# Patient Record
Sex: FEMALE | Race: WHITE | Employment: FULL TIME | URBAN - METROPOLITAN AREA
[De-identification: names, ages, dates, MRNs, and addresses within clinical notes are randomized per-mention and may not be internally consistent; named-entity substitution may affect disease eponyms.]

---

## 2021-10-27 ENCOUNTER — APPOINTMENT (OUTPATIENT)
Dept: CT IMAGING | Age: 61
DRG: 064 | End: 2021-10-27
Payer: COMMERCIAL

## 2021-10-27 ENCOUNTER — HOSPITAL ENCOUNTER (INPATIENT)
Age: 61
LOS: 2 days | Discharge: HOME | DRG: 064 | End: 2021-10-29
Attending: EMERGENCY MEDICINE | Admitting: INTERNAL MEDICINE
Payer: COMMERCIAL

## 2021-10-27 DIAGNOSIS — R41.82 ALTERED MENTAL STATUS, UNSPECIFIED ALTERED MENTAL STATUS TYPE: Primary | ICD-10-CM

## 2021-10-27 DIAGNOSIS — I63.81 BASAL GANGLIA STROKE (HCC): ICD-10-CM

## 2021-10-27 DIAGNOSIS — R13.10 DYSPHAGIA, UNSPECIFIED TYPE: ICD-10-CM

## 2021-10-27 LAB
ABSOLUTE EOS #: 0 K/UL (ref 0–0.4)
ABSOLUTE IMMATURE GRANULOCYTE: ABNORMAL K/UL (ref 0–0.3)
ABSOLUTE LYMPH #: 1.1 K/UL (ref 1–4.8)
ABSOLUTE MONO #: 0.3 K/UL (ref 0.1–1.3)
ALBUMIN SERPL-MCNC: 4.5 G/DL (ref 3.5–5.2)
ALBUMIN/GLOBULIN RATIO: NORMAL (ref 1–2.5)
ALP BLD-CCNC: 64 U/L (ref 35–104)
ALT SERPL-CCNC: 18 U/L (ref 5–33)
ANION GAP SERPL CALCULATED.3IONS-SCNC: 9 MMOL/L (ref 9–17)
AST SERPL-CCNC: 19 U/L
BASOPHILS # BLD: 1 % (ref 0–2)
BASOPHILS ABSOLUTE: 0 K/UL (ref 0–0.2)
BILIRUB SERPL-MCNC: 0.53 MG/DL (ref 0.3–1.2)
BILIRUBIN DIRECT: 0.13 MG/DL
BILIRUBIN, INDIRECT: 0.4 MG/DL (ref 0–1)
BNP INTERPRETATION: ABNORMAL
BUN BLDV-MCNC: 20 MG/DL (ref 8–23)
BUN/CREAT BLD: NORMAL (ref 9–20)
CALCIUM SERPL-MCNC: 9.4 MG/DL (ref 8.6–10.4)
CHLORIDE BLD-SCNC: 103 MMOL/L (ref 98–107)
CO2: 29 MMOL/L (ref 20–31)
CREAT SERPL-MCNC: 0.86 MG/DL (ref 0.5–0.9)
DIFFERENTIAL TYPE: ABNORMAL
EOSINOPHILS RELATIVE PERCENT: 1 % (ref 0–4)
GFR AFRICAN AMERICAN: >60 ML/MIN
GFR NON-AFRICAN AMERICAN: >60 ML/MIN
GFR SERPL CREATININE-BSD FRML MDRD: NORMAL ML/MIN/{1.73_M2}
GFR SERPL CREATININE-BSD FRML MDRD: NORMAL ML/MIN/{1.73_M2}
GLOBULIN: NORMAL G/DL (ref 1.5–3.8)
GLUCOSE BLD-MCNC: 99 MG/DL (ref 70–99)
HCT VFR BLD CALC: 42.2 % (ref 36–46)
HEMOGLOBIN: 13.8 G/DL (ref 12–16)
IMMATURE GRANULOCYTES: ABNORMAL %
LIPASE: 15 U/L (ref 13–60)
LYMPHOCYTES # BLD: 20 % (ref 24–44)
MAGNESIUM: 2 MG/DL (ref 1.6–2.6)
MCH RBC QN AUTO: 30.8 PG (ref 26–34)
MCHC RBC AUTO-ENTMCNC: 32.7 G/DL (ref 31–37)
MCV RBC AUTO: 94.2 FL (ref 80–100)
MONOCYTES # BLD: 5 % (ref 1–7)
NRBC AUTOMATED: ABNORMAL PER 100 WBC
PDW BLD-RTO: 13.7 % (ref 11.5–14.9)
PLATELET # BLD: 200 K/UL (ref 150–450)
PLATELET ESTIMATE: ABNORMAL
PMV BLD AUTO: 9.2 FL (ref 6–12)
POTASSIUM SERPL-SCNC: 3.9 MMOL/L (ref 3.7–5.3)
PRO-BNP: 562 PG/ML
RBC # BLD: 4.48 M/UL (ref 4–5.2)
RBC # BLD: ABNORMAL 10*6/UL
SEG NEUTROPHILS: 73 % (ref 36–66)
SEGMENTED NEUTROPHILS ABSOLUTE COUNT: 4 K/UL (ref 1.3–9.1)
SODIUM BLD-SCNC: 141 MMOL/L (ref 135–144)
TOTAL PROTEIN: 6.5 G/DL (ref 6.4–8.3)
TROPONIN INTERP: NORMAL
TROPONIN T: NORMAL NG/ML
TROPONIN, HIGH SENSITIVITY: 7 NG/L (ref 0–14)
WBC # BLD: 5.5 K/UL (ref 3.5–11)
WBC # BLD: ABNORMAL 10*3/UL

## 2021-10-27 PROCEDURE — 85025 COMPLETE CBC W/AUTO DIFF WBC: CPT

## 2021-10-27 PROCEDURE — 70496 CT ANGIOGRAPHY HEAD: CPT

## 2021-10-27 PROCEDURE — 93005 ELECTROCARDIOGRAM TRACING: CPT | Performed by: HEALTH CARE PROVIDER

## 2021-10-27 PROCEDURE — 36415 COLL VENOUS BLD VENIPUNCTURE: CPT

## 2021-10-27 PROCEDURE — 83690 ASSAY OF LIPASE: CPT

## 2021-10-27 PROCEDURE — 2060000000 HC ICU INTERMEDIATE R&B

## 2021-10-27 PROCEDURE — 99223 1ST HOSP IP/OBS HIGH 75: CPT | Performed by: INTERNAL MEDICINE

## 2021-10-27 PROCEDURE — 80076 HEPATIC FUNCTION PANEL: CPT

## 2021-10-27 PROCEDURE — 6370000000 HC RX 637 (ALT 250 FOR IP): Performed by: HEALTH CARE PROVIDER

## 2021-10-27 PROCEDURE — 83735 ASSAY OF MAGNESIUM: CPT

## 2021-10-27 PROCEDURE — 6360000004 HC RX CONTRAST MEDICATION: Performed by: HEALTH CARE PROVIDER

## 2021-10-27 PROCEDURE — 70450 CT HEAD/BRAIN W/O DYE: CPT

## 2021-10-27 PROCEDURE — 99285 EMERGENCY DEPT VISIT HI MDM: CPT

## 2021-10-27 PROCEDURE — 80048 BASIC METABOLIC PNL TOTAL CA: CPT

## 2021-10-27 PROCEDURE — 84484 ASSAY OF TROPONIN QUANT: CPT

## 2021-10-27 PROCEDURE — 83880 ASSAY OF NATRIURETIC PEPTIDE: CPT

## 2021-10-27 PROCEDURE — 2580000003 HC RX 258: Performed by: HEALTH CARE PROVIDER

## 2021-10-27 RX ORDER — ASPIRIN 81 MG/1
81 TABLET ORAL DAILY
Status: DISCONTINUED | OUTPATIENT
Start: 2021-10-28 | End: 2021-10-28

## 2021-10-27 RX ORDER — ASPIRIN 81 MG/1
324 TABLET, CHEWABLE ORAL ONCE
Status: COMPLETED | OUTPATIENT
Start: 2021-10-27 | End: 2021-10-27

## 2021-10-27 RX ORDER — 0.9 % SODIUM CHLORIDE 0.9 %
1000 INTRAVENOUS SOLUTION INTRAVENOUS ONCE
Status: DISCONTINUED | OUTPATIENT
Start: 2021-10-27 | End: 2021-10-29 | Stop reason: HOSPADM

## 2021-10-27 RX ORDER — SODIUM CHLORIDE 9 MG/ML
INJECTION, SOLUTION INTRAVENOUS CONTINUOUS
Status: DISCONTINUED | OUTPATIENT
Start: 2021-10-28 | End: 2021-10-28

## 2021-10-27 RX ORDER — SODIUM CHLORIDE 0.9 % (FLUSH) 0.9 %
10 SYRINGE (ML) INJECTION PRN
Status: DISCONTINUED | OUTPATIENT
Start: 2021-10-27 | End: 2021-10-29 | Stop reason: HOSPADM

## 2021-10-27 RX ORDER — 0.9 % SODIUM CHLORIDE 0.9 %
80 INTRAVENOUS SOLUTION INTRAVENOUS ONCE
Status: COMPLETED | OUTPATIENT
Start: 2021-10-27 | End: 2021-10-27

## 2021-10-27 RX ORDER — ASPIRIN 300 MG/1
300 SUPPOSITORY RECTAL ONCE
Status: DISCONTINUED | OUTPATIENT
Start: 2021-10-27 | End: 2021-10-27

## 2021-10-27 RX ORDER — ATORVASTATIN CALCIUM 40 MG/1
40 TABLET, FILM COATED ORAL NIGHTLY
Status: DISCONTINUED | OUTPATIENT
Start: 2021-10-28 | End: 2021-10-29

## 2021-10-27 RX ORDER — ONDANSETRON 2 MG/ML
4 INJECTION INTRAMUSCULAR; INTRAVENOUS EVERY 6 HOURS PRN
Status: DISCONTINUED | OUTPATIENT
Start: 2021-10-27 | End: 2021-10-29 | Stop reason: HOSPADM

## 2021-10-27 RX ORDER — SODIUM CHLORIDE 9 MG/ML
25 INJECTION, SOLUTION INTRAVENOUS PRN
Status: DISCONTINUED | OUTPATIENT
Start: 2021-10-27 | End: 2021-10-29 | Stop reason: HOSPADM

## 2021-10-27 RX ORDER — SODIUM CHLORIDE 0.9 % (FLUSH) 0.9 %
10 SYRINGE (ML) INJECTION EVERY 12 HOURS SCHEDULED
Status: DISCONTINUED | OUTPATIENT
Start: 2021-10-28 | End: 2021-10-29 | Stop reason: HOSPADM

## 2021-10-27 RX ADMIN — IOPAMIDOL 75 ML: 755 INJECTION, SOLUTION INTRAVENOUS at 20:06

## 2021-10-27 RX ADMIN — SODIUM CHLORIDE, PRESERVATIVE FREE 10 ML: 5 INJECTION INTRAVENOUS at 20:06

## 2021-10-27 RX ADMIN — ASPIRIN 324 MG: 81 TABLET, CHEWABLE ORAL at 21:47

## 2021-10-27 RX ADMIN — SODIUM CHLORIDE 80 ML: 9 INJECTION, SOLUTION INTRAVENOUS at 20:06

## 2021-10-27 ASSESSMENT — ENCOUNTER SYMPTOMS
VOMITING: 0
SHORTNESS OF BREATH: 0
ABDOMINAL PAIN: 0
BACK PAIN: 0
PHOTOPHOBIA: 0
TROUBLE SWALLOWING: 0
NAUSEA: 0

## 2021-10-27 ASSESSMENT — PAIN SCALES - GENERAL: PAINLEVEL_OUTOF10: 0

## 2021-10-27 NOTE — ED PROVIDER NOTES
[address] [facility]  eMERGENCY dEPARTMENT eNCOUnter   Attending Attestation     Pt Name: Simone Worley  MRN: [id number]  [place] 1960  Date of evaluation: 10/27/21    History, EXAM, MDM:    Simone Worley is a 59 y.o. female who presents with Altered Mental Status    Fatigue, word finding difficulties, withdrawn. Symptoms present for the last 4 days. On exam, VSS, GCS 15, NIHSS 1 for a mild dysarthria. Laboratory studies show no acute abnormalities. EKG shows a sinus rhythm. HR is  80,  , QRS 78, , no OLLIE, No STD, No TWI, the axis is normal.      CT head shows signs of a subacute stroke in the basal ganglia. Pt treated with aspirin. Admitting to hospital for further stroke evaluation. Pt not a TPA candidate given delayed presentation. Pt not an endovascular candidate given lack of large vessel occlusion / delayed presentation. Vitals:   Vitals:    10/27/21 2220 10/27/21 2330 10/28/21 0048 10/28/21 0736   BP: (!) 161/89 (!) 155/77  (!) 150/75   Pulse: 80 72  52   Resp: 17 18  14   Temp:  98.7 °F (37.1 °C)  97.9 °F (36.6 °C)   TempSrc:  Oral  Oral   SpO2:  96%  98%   Weight:   130 lb (59 kg)    Height:   5' 5\" (1.651 m)      I performed a history and physical examination of the patient and discussed management with the resident. I reviewed the residents note and agree with the documented findings and plan of care. Any areas of disagreement are noted on the chart. I was personally present for the key portions of any procedures. I have documented in the chart those procedures where I was not present during the key portions. I have personally reviewed all images and agree with the resident's interpretation. I have reviewed the emergency nurses triage note. I agree with the chief complaint, past medical history, past surgical history, allergies, medications, social and family history as documented unless otherwise noted below.  Documentation of the HPI, Physical Exam and Medical Decision Making performed by medical students or scribes is based on my personal performance of the HPI, PE and MDM. For Phys Assistant/ Nurse Practitioner cases/documentation I have had a face to face evaluation of this patient and have completed at least one if not all key elements of the E/M (history, physical exam, and MDM). Additional findings are as noted.     Mark Sheth MD  Attending Emergency  Physician                Mark Sheth MD  10/28/21 [id number]       Mark Sheth MD  10/28/21 7969

## 2021-10-27 NOTE — PROGRESS NOTES
Patient has CT head and CTA head/neck with contrast ordered at 4:31pm. As of 6:20pm, patient does not have an IV established or labs drawn yet. ED has been notified.

## 2021-10-27 NOTE — ED TRIAGE NOTES
Mode of arrival (squad #, walk in, police, etc) : walk in        Chief complaint(s): Altered Mental Status        Arrival Note (brief scenario, treatment PTA, etc). : Pt brought in by her  for eval of change in mentation. He states she has not been acting like herself since Saturday night and has worsened over the last few days. Pt denies urinary complaints and headache/dizziness. C= \"Have you ever felt that you should Cut down on your drinking? \"  No  A= \"Have people Annoyed you by criticizing your drinking? \"  No  G= \"Have you ever felt bad or Guilty about your drinking? \"  No  E= \"Have you ever had a drink as an Eye-opener first thing in the morning to steady your nerves or to help a hangover? \"  No      Deferred []      Reason for deferring: N/A    *If yes to two or more: probable alcohol abuse. *

## 2021-10-27 NOTE — Clinical Note
Patient Class: Observation [104]   REQUIRED: Diagnosis: Altered mental status [780.97. ICD-9-CM]   Estimated Length of Stay: Estimated stay of less than 2 midnights   Admitting Provider: Fabrizio Mac [[id number]]   Telemetry/Cardiac Monitoring Required?: No

## 2021-10-27 NOTE — ED PROVIDER NOTES
[address] [facility] ED  Emergency Department Encounter  Emergency Medicine Resident     Pt Name: Glen Drake  MRN: [id number]  [place] 1960  Date of evaluation: 10/27/21  PCP:  No primary care provider on file. CHIEF COMPLAINT       Chief Complaint   Patient presents with    Altered Mental Status       HISTORY OFPRESENT ILLNESS  (Location/Symptom, Timing/Onset, Context/Setting, Quality, Duration, Modifying Factors,Severity.)      Glen Drake is a 59 y.o. female who presents with altered mental status. Symptoms started Sunday. Patient's  is with her today. Patient reports issues with finding words and forming sentences, as well as increased fatigue. His  also notes that she appears withdrawn and is not her normal, talkative self. Patient denies any mood changes. She has no past medical history or surgical history. Patient does not take any prescription medications, but states she did start an over-the-counter herbal supplements last Wednesday, which is meant to improve her libido. Denies tobacco or drug use. Drinks 1 glass of red wine per day, but no more than that. Denies any family history of early onset Alzheimer's, or other neurodegenerative diseases. PAST MEDICAL / SURGICAL / SOCIAL / FAMILY HISTORY     Denies past medical history     has a past surgical history that includes Tonsillectomy.      Social History     Socioeconomic History    Marital status:      Spouse name: Not on file    Number of children: Not on file    Years of education: Not on file    Highest education level: Not on file   Occupational History    Not on file   Tobacco Use    Smoking status: Never Smoker    Smokeless tobacco: Never Used   Substance and Sexual Activity    Alcohol use: Not on file    Drug use: Never    Sexual activity: Not on file   Other Topics Concern    Not on file   Social History Narrative    Not on file     Social Determinants of Health     Financial Resource Strain:     Difficulty of Paying Living Expenses:    Food Insecurity:     Worried About Running Out of Food in the Last Year:     [address] in the Last Year:    Transportation Needs:     Lack of Transportation (Medical):  Lack of Transportation (Non-Medical):    Physical Activity:     Days of Exercise per Week:     Minutes of Exercise per Session:    Stress:     Feeling of Stress :    Social Connections:     Frequency of Communication with Friends and Family:     Frequency of Social Gatherings with Friends and Family:     Attends [employer] Services:     Active Member of Clubs or Organizations:     Attends Club or Organization Meetings:     Marital Status:    Intimate Partner Violence:     Fear of Current or Ex-Partner:     Emotionally Abused:     Physically Abused:     Sexually Abused:        History reviewed. No pertinent family history. Allergies:  Pcn [penicillins] and Suprax [cefixime]    Home Medications:  Prior to Admission medications    Not on File       REVIEW OFSYSTEMS    (2-9 systems for level 4, 10 or more for level 5)      Review of Systems   Constitutional: Positive for fatigue. Negative for chills, diaphoresis and fever. HENT: Positive for tinnitus. Negative for trouble swallowing. Left-sided tinnitus at baseline. No change since onset of symptoms. Eyes: Negative for photophobia and visual disturbance. Respiratory: Negative for shortness of breath. Cardiovascular: Negative for chest pain. Gastrointestinal: Negative for abdominal pain, nausea and vomiting. Genitourinary: Negative for difficulty urinating. Musculoskeletal: Negative for back pain. Allergic/Immunologic: Negative for immunocompromised state. Neurological: Positive for speech difficulty. Negative for dizziness, tremors, seizures, syncope, facial asymmetry, weakness, light-headedness, numbness and headaches. Hematological: Does not bruise/bleed easily. Psychiatric/Behavioral: Negative for decreased concentration, dysphoric mood and sleep disturbance. PHYSICAL EXAM   (up to 7 for level 4, 8 or more forlevel 5)      INITIAL VITALS:   ED Triage Vitals [10/27/21 1505]   BP Temp Temp Source Pulse Resp SpO2 Height Weight   (!) 154/60 98.8 °F (37.1 °C) Oral 77 16 97 % -- --       Physical Exam  Vitals reviewed. Constitutional:       Appearance: She is well-developed. HENT:      Head: Normocephalic and atraumatic. Mouth/Throat:      Mouth: Mucous membranes are moist.      Pharynx: Oropharynx is clear. Eyes:      General: No visual field deficit. Extraocular Movements: Extraocular movements intact. Right eye: Normal extraocular motion and no nystagmus. Left eye: Normal extraocular motion and no nystagmus. Pupils: Pupils are equal, round, and reactive to light. Right eye: Pupil is round and reactive. Left eye: Pupil is round and reactive. Cardiovascular:      Rate and Rhythm: Normal rate and regular rhythm. Heart sounds: Normal heart sounds. Pulmonary:      Effort: Pulmonary effort is normal.      Breath sounds: Normal breath sounds. Abdominal:      General: Bowel sounds are normal.      Palpations: Abdomen is soft. Musculoskeletal:         General: No tenderness. Normal range of motion. Cervical back: Normal range of motion and neck supple. No rigidity. Skin:     General: Skin is warm and dry. Neurological:      Mental Status: She is alert. GCS: GCS eye subscore is 4. GCS verbal subscore is 5. GCS motor subscore is 6. Cranial Nerves: Dysarthria present. No cranial nerve deficit or facial asymmetry. Sensory: No sensory deficit. Motor: No weakness. Coordination: Romberg sign negative. Coordination normal.      Gait: Gait normal.      Comments: Some slight delay in finding words, but responding appropriately. Able to repeat words, name objects and spell world backwards.   Difficulty with subtracting by sevens after she reached 86. Unable to recall 3 words at 3 minutes. Psychiatric:         Mood and Affect: Mood normal.         Behavior: Behavior normal.         DIFFERENTIAL  DIAGNOSIS     PLAN (LABS / IMAGING / EKG):  Orders Placed This Encounter   Procedures    CT Head WO Contrast    CTA HEAD NECK W CONTRAST    Urinalysis Reflex to Culture    Hepatic Function Panel    Magnesium    Troponin    Brain Natriuretic Peptide    Lipase    Basic Metabolic Panel w/ Reflex to MG    CBC Auto Differential    Inpatient consult to Internal Medicine    Inpatient consult to Neurology    EKG 12 Lead    Saline lock IV    Insert peripheral IV    PATIENT STATUS (FROM ED OR OR/PROCEDURAL) Observation       MEDICATIONS ORDERED:  Orders Placed This Encounter   Medications    0.9 % sodium chloride bolus    iopamidol (ISOVUE-370) 76 % injection 75 mL    sodium chloride flush 0.9 % injection 10 mL    0.9 % sodium chloride bolus    aspirin chewable tablet 324 mg    aspirin suppository 300 mg       DDX: Stroke, metabolic derangement, infection, reaction to herbal medication    Initial MDM/Plan: 59 y.o. female who presents with fatigue and increased difficulty word finding. No other focal neurologic deficits besides slowed speech and inability to recall words. Will obtain noncontrast CT of the head, as well as CTA of the head and neck. Laboratory work-up for altered mental status.     DIAGNOSTIC RESULTS / EMERGENCYDEPARTMENT COURSE / MDM     LABS:  Labs Reviewed   BRAIN NATRIURETIC PEPTIDE - Abnormal; Notable for the following components:       Result Value    Pro- (*)     All other components within normal limits   CBC WITH AUTO DIFFERENTIAL - Abnormal; Notable for the following components:    Seg Neutrophils 73 (*)     Lymphocytes 20 (*)     All other components within normal limits   HEPATIC FUNCTION PANEL   MAGNESIUM   TROPONIN   LIPASE   BASIC METABOLIC PANEL W/ REFLEX TO MG FOR LOW K ganglia region lacunar infarcts and a larger infarct within the anterior left basal ganglia region which may be subacute or chronic. CTA HEAD NECK W CONTRAST    Result Date: 10/27/2021  EXAMINATION: CTA OF THE HEAD AND NECK WITH CONTRAST 10/27/2021 7:29 pm: TECHNIQUE: CTA of the head and neck was performed with the administration of intravenous contrast. Multiplanar reformatted images are provided for review. MIP images are provided for review. Stenosis of the internal carotid arteries measured using NASCET criteria. Dose modulation, iterative reconstruction, and/or weight based adjustment of the mA/kV was utilized to reduce the radiation dose to as low as reasonably achievable. COMPARISON: CT head 10/27/2021 HISTORY: ORDERING SYSTEM PROVIDED HISTORY: AMS, difficulty with word-finding TECHNOLOGIST PROVIDED HISTORY: AMS, difficulty with word-finding Decision Support Exception - unselect if not a suspected or confirmed emergency medical condition->Emergency Medical Condition (MA) Reason for Exam: AMS, difficulty with word-finding Acuity: Unknown Type of Exam: Unknown FINDINGS: CTA NECK: AORTIC ARCH/ARCH VESSELS: No dissection or arterial injury. No significant stenosis of the brachiocephalic or subclavian arteries. CAROTID ARTERIES: No dissection, arterial injury, or hemodynamically significant stenosis by NASCET criteria. VERTEBRAL ARTERIES: No dissection, arterial injury, or significant stenosis. SOFT TISSUES: The lung apices are clear. No cervical or superior mediastinal lymphadenopathy. The larynx and pharynx are unremarkable. 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 No acute abnormality of the salivary and thyroid glands. BONES: Degenerate change CTA HEAD: ANTERIOR CIRCULATION: No significant stenosis of the intracranial internal carotid, anterior cerebral, or middle cerebral arteries.   Small infundibulum at the origin the right PCOM. Fetal origin left PCA. Luis Acosta Prominent A-comm likely related to aplastic or hypoplastic left A1 segment. POSTERIOR CIRCULATION: No significant stenosis of the vertebral, basilar, or posterior cerebral arteries. No aneurysm. OTHER: No dural venous sinus thrombosis on this non-dedicated study. BRAIN: Evolving left left anterior basal ganglial infarct. Negative for large vessel occlusion or hemodynamic stenosis. Post mediastinal mass right upper lung, thought to be could represent a neurofibroma versus schwannoma. .  This could be further evaluated with MRI thoracic spine or with and without without contrast for further characterization. EKG    EKG Interpretation    Interpreted by me    Rhythm: normal sinus   Rate: normal  Axis: normal  Ectopy: none  Conduction: normal  ST Segments: no acute change  T Waves: no acute change  Q Waves: none    Clinical Impression: no acute changes and normal EKG    All EKG's are interpreted by the Emergency Department Physicianwho either signs or Co-signs this chart in the absence of a cardiologist.    EMERGENCY DEPARTMENT COURSE:  ED Course as of Oct 27 2210   Wed Oct 27, 2021   2137 Work-up is concerning for subacute stroke. Will give patient 324 of aspirin and admit for neurology evaluation and MRI tomorrow morning. [GG]      ED Course User Index  [GG] Nicho Lazar MD          PROCEDURES:  None    CONSULTS:  IP CONSULT TO INTERNAL MEDICINE  IP CONSULT TO NEUROLOGY    CRITICAL CARE:  Please see attending note    FINAL IMPRESSION      1. Altered mental status, unspecified altered mental status type    2. Basal ganglia stroke ([facility] [address]    3. Dysphagia, unspecified type          DISPOSITION / PLAN     DISPOSITION      Admitted    PATIENT REFERRED TO:  No follow-up provider specified.     DISCHARGE MEDICATIONS:  New Prescriptions    No medications on file       Nicho Lazar MD  Emergency Medicine Resident    (Please note that portions of this note were completed with a voice recognition program.Efforts were made to edit the dictations but occasionally words are mis-transcribed.)        Shady Kim MD  Resident  10/27/21 2431

## 2021-10-28 ENCOUNTER — APPOINTMENT (OUTPATIENT)
Dept: MRI IMAGING | Age: 61
DRG: 064 | End: 2021-10-28
Payer: COMMERCIAL

## 2021-10-28 ENCOUNTER — APPOINTMENT (OUTPATIENT)
Dept: GENERAL RADIOLOGY | Age: 61
DRG: 064 | End: 2021-10-28
Payer: COMMERCIAL

## 2021-10-28 PROBLEM — R91.8 MASS OF UPPER LOBE OF RIGHT LUNG: Status: ACTIVE | Noted: 2021-10-28

## 2021-10-28 PROBLEM — R53.83 FATIGUE: Status: ACTIVE | Noted: 2021-10-28

## 2021-10-28 PROBLEM — R47.89 WORD FINDING DIFFICULTY: Status: ACTIVE | Noted: 2021-10-28

## 2021-10-28 LAB
-: ABNORMAL
-: ABNORMAL
ALBUMIN SERPL-MCNC: 4 G/DL (ref 3.5–5.2)
ALBUMIN/GLOBULIN RATIO: ABNORMAL (ref 1–2.5)
ALP BLD-CCNC: 51 U/L (ref 35–104)
ALT SERPL-CCNC: 15 U/L (ref 5–33)
AMORPHOUS: ABNORMAL
AMORPHOUS: ABNORMAL
AMPHETAMINE SCREEN URINE: NEGATIVE
ANION GAP SERPL CALCULATED.3IONS-SCNC: 8 MMOL/L (ref 9–17)
AST SERPL-CCNC: 14 U/L
BACTERIA: ABNORMAL
BACTERIA: ABNORMAL
BARBITURATE SCREEN URINE: NEGATIVE
BENZODIAZEPINE SCREEN, URINE: NEGATIVE
BILIRUB SERPL-MCNC: 0.71 MG/DL (ref 0.3–1.2)
BILIRUBIN URINE: NEGATIVE
BILIRUBIN URINE: NEGATIVE
BUN BLDV-MCNC: 14 MG/DL (ref 8–23)
BUN/CREAT BLD: ABNORMAL (ref 9–20)
BUPRENORPHINE URINE: NORMAL
CALCIUM SERPL-MCNC: 8.9 MG/DL (ref 8.6–10.4)
CANNABINOID SCREEN URINE: NEGATIVE
CASTS UA: ABNORMAL /LPF
CASTS UA: ABNORMAL /LPF
CHLORIDE BLD-SCNC: 106 MMOL/L (ref 98–107)
CHOLESTEROL/HDL RATIO: 2.8
CHOLESTEROL: 167 MG/DL
CO2: 29 MMOL/L (ref 20–31)
COCAINE METABOLITE, URINE: NEGATIVE
COLOR: YELLOW
COLOR: YELLOW
COMMENT UA: ABNORMAL
COMMENT UA: ABNORMAL
CREAT SERPL-MCNC: 0.63 MG/DL (ref 0.5–0.9)
CRYSTALS, UA: ABNORMAL /HPF
CRYSTALS, UA: ABNORMAL /HPF
D-DIMER QUANTITATIVE: 0.3 MG/L FEU (ref 0–0.59)
EKG ATRIAL RATE: 80 BPM
EKG P AXIS: 74 DEGREES
EKG P-R INTERVAL: 160 MS
EKG Q-T INTERVAL: 378 MS
EKG QRS DURATION: 78 MS
EKG QTC CALCULATION (BAZETT): 435 MS
EKG R AXIS: 64 DEGREES
EKG T AXIS: 73 DEGREES
EKG VENTRICULAR RATE: 80 BPM
EPITHELIAL CELLS UA: ABNORMAL /HPF
EPITHELIAL CELLS UA: ABNORMAL /HPF
ESTIMATED AVERAGE GLUCOSE: 97 MG/DL
GFR AFRICAN AMERICAN: >60 ML/MIN
GFR NON-AFRICAN AMERICAN: >60 ML/MIN
GFR SERPL CREATININE-BSD FRML MDRD: ABNORMAL ML/MIN/{1.73_M2}
GFR SERPL CREATININE-BSD FRML MDRD: ABNORMAL ML/MIN/{1.73_M2}
GLUCOSE BLD-MCNC: 91 MG/DL (ref 70–99)
GLUCOSE URINE: NEGATIVE
GLUCOSE URINE: NEGATIVE
HBA1C MFR BLD: 5 % (ref 4–6)
HCT VFR BLD CALC: 39.4 % (ref 36–46)
HDLC SERPL-MCNC: 60 MG/DL
HEMOGLOBIN: 13.1 G/DL (ref 12–16)
KETONES, URINE: ABNORMAL
KETONES, URINE: ABNORMAL
LDL CHOLESTEROL: 93 MG/DL (ref 0–130)
LEUKOCYTE ESTERASE, URINE: NEGATIVE
LEUKOCYTE ESTERASE, URINE: NEGATIVE
MCH RBC QN AUTO: 31.1 PG (ref 26–34)
MCHC RBC AUTO-ENTMCNC: 33.2 G/DL (ref 31–37)
MCV RBC AUTO: 93.7 FL (ref 80–100)
MDMA URINE: NORMAL
METHADONE SCREEN, URINE: NEGATIVE
METHAMPHETAMINE, URINE: NORMAL
MUCUS: ABNORMAL
MUCUS: ABNORMAL
NITRITE, URINE: NEGATIVE
NITRITE, URINE: NEGATIVE
NRBC AUTOMATED: NORMAL PER 100 WBC
OPIATES, URINE: NEGATIVE
OTHER OBSERVATIONS UA: ABNORMAL
OTHER OBSERVATIONS UA: ABNORMAL
OXYCODONE SCREEN URINE: NEGATIVE
PDW BLD-RTO: 13.2 % (ref 11.5–14.9)
PH UA: 7 (ref 5–8)
PH UA: 7 (ref 5–8)
PHENCYCLIDINE, URINE: NEGATIVE
PLATELET # BLD: 185 K/UL (ref 150–450)
PMV BLD AUTO: 9.7 FL (ref 6–12)
POTASSIUM SERPL-SCNC: 3.6 MMOL/L (ref 3.7–5.3)
PROPOXYPHENE, URINE: NORMAL
PROTEIN UA: NEGATIVE
PROTEIN UA: NEGATIVE
RBC # BLD: 4.21 M/UL (ref 4–5.2)
RBC UA: ABNORMAL /HPF
RBC UA: ABNORMAL /HPF
RENAL EPITHELIAL, UA: ABNORMAL /HPF
RENAL EPITHELIAL, UA: ABNORMAL /HPF
SODIUM BLD-SCNC: 143 MMOL/L (ref 135–144)
SPECIFIC GRAVITY UA: 1.01 (ref 1–1.03)
SPECIFIC GRAVITY UA: 1.01 (ref 1–1.03)
TEST INFORMATION: NORMAL
TOTAL PROTEIN: 5.6 G/DL (ref 6.4–8.3)
TRICHOMONAS: ABNORMAL
TRICHOMONAS: ABNORMAL
TRICYCLIC ANTIDEPRESSANTS, UR: NORMAL
TRIGL SERPL-MCNC: 71 MG/DL
TURBIDITY: CLEAR
TURBIDITY: CLEAR
URINE HGB: ABNORMAL
URINE HGB: ABNORMAL
UROBILINOGEN, URINE: NORMAL
UROBILINOGEN, URINE: NORMAL
VLDLC SERPL CALC-MCNC: NORMAL MG/DL (ref 1–30)
WBC # BLD: 4.6 K/UL (ref 3.5–11)
WBC UA: ABNORMAL /HPF
WBC UA: ABNORMAL /HPF
YEAST: ABNORMAL
YEAST: ABNORMAL

## 2021-10-28 PROCEDURE — 6370000000 HC RX 637 (ALT 250 FOR IP): Performed by: NURSE PRACTITIONER

## 2021-10-28 PROCEDURE — 97166 OT EVAL MOD COMPLEX 45 MIN: CPT

## 2021-10-28 PROCEDURE — 99253 IP/OBS CNSLTJ NEW/EST LOW 45: CPT | Performed by: PSYCHIATRY & NEUROLOGY

## 2021-10-28 PROCEDURE — A9579 GAD-BASE MR CONTRAST NOS,1ML: HCPCS | Performed by: PSYCHIATRY & NEUROLOGY

## 2021-10-28 PROCEDURE — 80061 LIPID PANEL: CPT

## 2021-10-28 PROCEDURE — 6370000000 HC RX 637 (ALT 250 FOR IP): Performed by: INTERNAL MEDICINE

## 2021-10-28 PROCEDURE — 85027 COMPLETE CBC AUTOMATED: CPT

## 2021-10-28 PROCEDURE — 97530 THERAPEUTIC ACTIVITIES: CPT

## 2021-10-28 PROCEDURE — 97162 PT EVAL MOD COMPLEX 30 MIN: CPT

## 2021-10-28 PROCEDURE — 36415 COLL VENOUS BLD VENIPUNCTURE: CPT

## 2021-10-28 PROCEDURE — 92523 SPEECH SOUND LANG COMPREHEN: CPT

## 2021-10-28 PROCEDURE — 2580000003 HC RX 258: Performed by: PSYCHIATRY & NEUROLOGY

## 2021-10-28 PROCEDURE — 92611 MOTION FLUOROSCOPY/SWALLOW: CPT

## 2021-10-28 PROCEDURE — 97116 GAIT TRAINING THERAPY: CPT

## 2021-10-28 PROCEDURE — 80307 DRUG TEST PRSMV CHEM ANLYZR: CPT

## 2021-10-28 PROCEDURE — 2580000003 HC RX 258: Performed by: NURSE PRACTITIONER

## 2021-10-28 PROCEDURE — 80053 COMPREHEN METABOLIC PANEL: CPT

## 2021-10-28 PROCEDURE — 6360000004 HC RX CONTRAST MEDICATION: Performed by: PSYCHIATRY & NEUROLOGY

## 2021-10-28 PROCEDURE — 70553 MRI BRAIN STEM W/O & W/DYE: CPT

## 2021-10-28 PROCEDURE — 81001 URINALYSIS AUTO W/SCOPE: CPT

## 2021-10-28 PROCEDURE — 93010 ELECTROCARDIOGRAM REPORT: CPT | Performed by: INTERNAL MEDICINE

## 2021-10-28 PROCEDURE — 83036 HEMOGLOBIN GLYCOSYLATED A1C: CPT

## 2021-10-28 PROCEDURE — 85379 FIBRIN DEGRADATION QUANT: CPT

## 2021-10-28 PROCEDURE — 2060000000 HC ICU INTERMEDIATE R&B

## 2021-10-28 PROCEDURE — 74230 X-RAY XM SWLNG FUNCJ C+: CPT

## 2021-10-28 PROCEDURE — 72157 MRI CHEST SPINE W/O & W/DYE: CPT

## 2021-10-28 RX ORDER — CIPROFLOXACIN 500 MG/1
500 TABLET, FILM COATED ORAL EVERY 12 HOURS SCHEDULED
Status: DISCONTINUED | OUTPATIENT
Start: 2021-10-28 | End: 2021-10-29 | Stop reason: HOSPADM

## 2021-10-28 RX ORDER — LISINOPRIL 20 MG/1
20 TABLET ORAL DAILY
Status: DISCONTINUED | OUTPATIENT
Start: 2021-10-28 | End: 2021-10-29 | Stop reason: HOSPADM

## 2021-10-28 RX ORDER — SODIUM CHLORIDE 0.9 % (FLUSH) 0.9 %
10 SYRINGE (ML) INJECTION PRN
Status: DISCONTINUED | OUTPATIENT
Start: 2021-10-28 | End: 2021-10-29 | Stop reason: HOSPADM

## 2021-10-28 RX ADMIN — SODIUM CHLORIDE, PRESERVATIVE FREE 10 ML: 5 INJECTION INTRAVENOUS at 20:49

## 2021-10-28 RX ADMIN — ATORVASTATIN CALCIUM 40 MG: 40 TABLET, FILM COATED ORAL at 22:49

## 2021-10-28 RX ADMIN — SODIUM CHLORIDE: 9 INJECTION, SOLUTION INTRAVENOUS at 00:42

## 2021-10-28 RX ADMIN — GADOTERIDOL 13 ML: 279.3 INJECTION, SOLUTION INTRAVENOUS at 18:55

## 2021-10-28 RX ADMIN — CIPROFLOXACIN 500 MG: 500 TABLET, FILM COATED ORAL at 22:50

## 2021-10-28 RX ADMIN — SODIUM CHLORIDE, PRESERVATIVE FREE 10 ML: 5 INJECTION INTRAVENOUS at 18:56

## 2021-10-28 ASSESSMENT — ENCOUNTER SYMPTOMS
COUGH: 0
PHOTOPHOBIA: 0
SHORTNESS OF BREATH: 0
VOMITING: 0
NAUSEA: 0
ALLERGIC/IMMUNOLOGIC NEGATIVE: 1
DIARRHEA: 0
COLOR CHANGE: 0
ABDOMINAL PAIN: 0

## 2021-10-28 ASSESSMENT — VISUAL ACUITY: OU: 1

## 2021-10-28 ASSESSMENT — PAIN SCALES - GENERAL
PAINLEVEL_OUTOF10: 0
PAINLEVEL_OUTOF10: 0

## 2021-10-28 NOTE — ED NOTES
Report given to NETTA linn from ed. Report method BY PHONE   The following was reviewed with receiving RN:   Current vital signs:  BP (!) 161/89   Pulse 80   Temp 98.8 °F (37.1 °C) (Oral)   Resp 17   SpO2 97%                MEWS Score: 1     Any medication or safety alerts were reviewed. Any pending diagnostics and notifications were also reviewed, as well as any safety concerns or issues, abnormal labs, abnormal imaging, and abnormal assessment findings. Questions were answered.           Ozzy Craft RN  10/27/21 0037

## 2021-10-28 NOTE — PROGRESS NOTES
RN performed bedside swallow. Patient tolerated sips of water but RN was unable to actually see patient swallow water. RN left room, and patient threw up her water. RN notified Corin Bosch RN with Dr. Nancy Rico, keep patient NPO and wait until speech evaluates patient.  She will update the

## 2021-10-28 NOTE — CARE COORDINATION
CASE MANAGEMENT NOTE:    Admission Date:  10/27/2021 Kaur Bowser is a 59 y.o.  female    Admitted for : Altered mental status [R41.82]  Altered mental status, unspecified altered mental status type [R41.82]  Dysphagia, unspecified type [R13.10]  Basal ganglia stroke ([facility] [address] [I63.81]  AMS (altered mental status) [R41.82]    Met with:  Patient    PCP:  Dr. Raheem Watkins, in [facility]:  [facility], Help is Following. Is patient alert and oriented at time of discussion:  Yes    Current Residence/ Living Arrangements:  independently at home Lives w/ , Is here from [place], visiting Family, was leaving to return yesterday            Current Services PTA:  No    Does patient go to outpatient dialysis: No  If yes, location and chair time: NA    Is patient agreeable to VNS: No    Freedom of choice provided:  No    List of [address] provided: No    VNS chosen:  No    DME:  none    Home Oxygen: No    Nebulizer: No    CPAP/BIPAP: No    Supplier: N/A    Potential Assistance Needed: No    SNF needed: No    Freedom of choice and list provided: NA    Pharmacy:  Would like [facility] to take with them if needed       Does Patient want to use MEDS to BEDS? No    Is patient currently receiving oral anticoagulation therapy? No    Is the Patient an [facility]ALE[facility] with Readmission Risk Score greater than 14%? No  If yes, pt needs a follow up appointment made within 7 days. Family Members/Caregivers that pt would like involved in their care:    Yes    If yes, list name here:  , Debby S Keller    Transportation Provider:  Patient             Discharge Plan:  10/28/21 [facility], Help following, Pt. Is here w/ her , from [place], Is visiting family, Was planning to return yesterday. No DME. MRI Brain, Neuro, Pro . Wants Paper scripts to take w/ her, Has PCP in [place].  Will follow//KB                 Electronically signed by: Talon King Jyothi Roldan RN on 10/28/2021 at 9:41 AM

## 2021-10-28 NOTE — CONSULTS
Neurology Consult Note        Reason for Consult:  AMS  Requesting Physician:  DR Aria Richardson    CHIEF COMPLAINT:  confusion    History Obtained From:  patient, spouse, electronic medical record       HISTORY OF PRESENT ILLNESS:    Tramaine Dickson is a 59 y.o. female who presented with altered mental status. Symptoms started Sunday. Patient's  is with her today and they had traveled in from [place] recently. The patient does report too much in the way of concerns but her  reports that she has not been herself for several days. She has been less talkative, more sleepy and was also getting the date incorrect. She has no past medical history or surgical history but doesn't tend to go to the doctor much and favors \"natural supplements\" over medications. She does not take any prescription medications, but states she did start an over-the-counter herbal supplements last Wednesday, which is meant to improve her libido. Denies tobacco or drug use. Drinks 1 glass of red wine per day, but no more than that.       Denies any family history of early onset Alzheimer's, or other neurodegenerative diseases. Her admission head CT did reveal a number of subcortical strokes of unclear age.      Of note, she did get the J and J vaccine a couple of weeks ago.     PAST MEDICAL / SURGICAL / SOCIAL / FAMILY HISTORY      Denies past medical history      has a past surgical history that includes Tonsillectomy.      Social History               Socioeconomic History    Marital status:        Spouse name: Not on file    Number of children: Not on file    Years of education: Not on file    Highest education level: Not on file   Occupational History    Not on file   Tobacco Use    Smoking status: Never Smoker    Smokeless tobacco: Never Used   Substance and Sexual Activity    Alcohol use: Not on file    Drug use: Never    Sexual activity: Not on file   Other Topics Concern    Not on file   Social History Narrative    Not on file      Social Determinants of Health          Financial Resource Strain:     Difficulty of Paying Living Expenses:    Food Insecurity:     Worried About Running Out of Food in the Last Year:     [address] in the Last Year:    Transportation Needs:     Lack of Transportation (Medical):  Lack of Transportation (Non-Medical):    Physical Activity:     Days of Exercise per Week:     Minutes of Exercise per Session:    Stress:     Feeling of Stress :    Social Connections:     Frequency of Communication with Friends and Family:     Frequency of Social Gatherings with Friends and Family:     Attends [employer] Services:     Active Member of Clubs or Organizations:     Attends Club or Organization Meetings:     Marital Status:    Intimate Partner Violence:     Fear of Current or Ex-Partner:     Emotionally Abused:     Physically Abused:     Sexually Abused:             Family History   History reviewed. No pertinent family history.         Allergies:  Pcn [penicillins] and Suprax [cefixime]     Home Medications:  Home Medications   Prior to Admission medications    Not on File            Past Medical History:    History reviewed. No pertinent past medical history.   Past Surgical History:        Procedure Laterality Date    TONSILLECTOMY       Current Medications:   Current Facility-Administered Medications: [START ON 10/29/2021] aspirin EC tablet 325 mg, 325 mg, Oral, Daily  lisinopril (PRINIVIL;ZESTRIL) tablet 20 mg, 20 mg, Oral, Daily  ciprofloxacin (CIPRO) tablet 500 mg, 500 mg, Oral, 2 times per day  0.9 % sodium chloride bolus, 1,000 mL, IntraVENous, Once  sodium chloride flush 0.9 % injection 10 mL, 10 mL, IntraVENous, PRN  sodium chloride flush 0.9 % injection 10 mL, 10 mL, IntraVENous, 2 times per day  sodium chloride flush 0.9 % injection 10 mL, 10 mL, IntraVENous, PRN  0.9 % sodium chloride infusion, 25 mL, IntraVENous, PRN  enoxaparin (LOVENOX) injection 40 mg, 40 mg, SubCUTAneous, Daily  atorvastatin (LIPITOR) tablet 40 mg, 40 mg, Oral, Nightly  ondansetron (ZOFRAN) injection 4 mg, 4 mg, IntraVENous, Q6H PRN  Allergies:  Pcn [penicillins] and Suprax [cefixime]    Social History:  TOBACCO:   reports that she has never smoked. She has never used smokeless tobacco.  ETOH:   has no history on file for alcohol use. DRUGS:   reports no history of drug use.  }      Family History:   History reviewed. No pertinent family history. REVIEW OFSYSTEMS    (2-9 systems for level 4, 10 or more for level 5)       Review of Systems   Constitutional: Positive for fatigue. Negative for chills, diaphoresis and fever. HENT: Positive for tinnitus. Negative for trouble swallowing. Left-sided tinnitus at baseline. No change since onset of symptoms. Eyes: Negative for photophobia and visual disturbance. Respiratory: Negative for shortness of breath. Cardiovascular: Negative for chest pain. Gastrointestinal: Negative for abdominal pain, nausea and vomiting. Genitourinary: Negative for difficulty urinating. Musculoskeletal: Negative for back pain. Allergic/Immunologic: Negative for immunocompromised state. Neurological: Positive for speech difficulty. Negative for dizziness, tremors, seizures, syncope, facial asymmetry, weakness, light-headedness, numbness and headaches. Hematological: Does not bruise/bleed easily.    Psychiatric/Behavioral: Negative for decreased concentration, dysphoric mood and sleep disturbance    PHYSICAL EXAM:    Vitals:  BP (!) 150/73   Pulse 68   Temp 98.2 °F (36.8 °C) (Oral)   Resp 16   Ht 5' 5\" (1.651 m)   Wt 130 lb (59 kg)   SpO2 94%   BMI 21.63 kg/m²      General Exam:  Normal body habitus, no acute distress    HEENT:  Normocephalic, atraumatic  Eyes: conjunctiva non-injected, sclera anicteric  Mucous membranes of normal color and hydration status    CV: Heart regular to rate and rhythm, no murmurs rubs or gallops  No peripheral edema    Lungs: Clear to auscultation anteriorly      Neurologic exam:     Mental status: alert and oriented to person, place, time and situation but speech is a little slow at times. No overt visuospatial neglect or gaze preference  Language with normal fluency and comprehension to simple commands. Cranial nerves:  Pupils equal round and reactive to light, no eyelid ptosis  Extraocular movements: intact and full.   There is some mild right lower facial weakness  Hearing is intact to conversation  Tongue midline, no dysarthria or dysphonia    Motor exam:  Strength   (Right/Left)  Deltoids           5/5  Biceps             5/5  Triceps            5/5  Wrist ext          5/5  Finger ext        5/5  Hand intrin       5/5    Hip flex            5/5  Knee ext          5/5  Ankle dorsi       5/5  EHL                  5/5    DTRs           (right/left)  Biceps                2/2  Brachorad          2/2  Patellar               2/2  Ankles                2/2    Coordination  Finger nose finger intact bilaterally    Sensation: intact to LT  sense in hands and feet      No pronator drift      DATA  LABS:  General Labs:    CBC:   Lab Results   Component Value Date    WBC 4.6 10/28/2021    RBC 4.21 10/28/2021    HGB 13.1 10/28/2021    HCT 39.4 10/28/2021    MCV 93.7 10/28/2021    MCH 31.1 10/28/2021    MCHC 33.2 10/28/2021    RDW 13.2 10/28/2021     10/28/2021    MPV 9.7 10/28/2021     CBC with Differential:    Lab Results   Component Value Date    WBC 4.6 10/28/2021    RBC 4.21 10/28/2021    HGB 13.1 10/28/2021    HCT 39.4 10/28/2021     10/28/2021    MCV 93.7 10/28/2021    MCH 31.1 10/28/2021    MCHC 33.2 10/28/2021    RDW 13.2 10/28/2021    LYMPHOPCT 20 10/27/2021    MONOPCT 5 10/27/2021    BASOPCT 1 10/27/2021    MONOSABS 0.30 10/27/2021    LYMPHSABS 1.10 10/27/2021    EOSABS 0.00 10/27/2021    BASOSABS 0.00 10/27/2021    DIFFTYPE NOT REPORTED 10/27/2021     WBC:    Lab Results   Component Value Date    WBC 4.6 10/28/2021     Platelets:    Lab Results   Component Value Date     10/28/2021     Hemoglobin/Hematocrit:    Lab Results   Component Value Date    HGB 13.1 10/28/2021    HCT 39.4 10/28/2021     CMP:    Lab Results   Component Value Date     10/28/2021    K 3.6 10/28/2021     10/28/2021    CO2 29 10/28/2021    BUN 14 10/28/2021    CREATININE 0.63 10/28/2021    GFRAA >60 10/28/2021    LABGLOM >60 10/28/2021    GLUCOSE 91 10/28/2021    PROT 5.6 10/28/2021    LABALBU 4.0 10/28/2021    CALCIUM 8.9 10/28/2021    BILITOT 0.71 10/28/2021    ALKPHOS 51 10/28/2021    AST 14 10/28/2021    ALT 15 10/28/2021       IMPRESSION:   This is a 58 y/o WF with no significant reported PMH history who presented subacutely with some confusion and alteration in level of alertness. 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 RECOMMENDATIONS:     1. MRI brain but will add TOM  2. Continue antiplatelet therapy           Marshal Garcia. Joshua Napoles M.D.   Clinical Neurophysiologist  Neuromuscular Medicine

## 2021-10-28 NOTE — H&P
[address] [facility]     HISTORY AND PHYSICAL EXAMINATION            Date:   10/28/2021  Patientname:  Leroy Starks  Date of admission:  10/27/2021  3:17 PM  MRN:   [id number]  Account:  [de-identified]  YOB: 1960  PCP:    No primary care provider on file. Room:   [address] [place] [address]  Code Status:    Full Code    CHIEF COMPLAINT     Chief Complaint   Patient presents with    Altered Mental Status       HISTORY OF PRESENT ILLNESS  (Character, Onset, Location, Duration,  Exacerbating/RelievingFactors, Radiation,   Associated Symptoms, Severity )      The patient is a 59 y.o.  female, with no significant medical history. Patient does not take any prescribed medications. Patient did start taking a herbal supplement recently but cannot recall its name. Patient brought to the ER by her  for evaluation of fatigue, confusion, difficulty finding words and withdrawn.  states that he noticed patient symptoms 4 days ago. Patient denies headache, visual disturbance, nausea, abdominal pain, chest pain, cough, shortness of breath, dysuria, fever or chills. She does not smoke. She does drink 1 glass of wine per day. Denies drug use.  states him and his wife recently traveled from [place] to visit family in this area. HPI   1) Location/Symptom fatigue, withdrawn, confusion, difficulty finding words  2) Timing/Onset: Gradual onset 4 days ago  3) Context/Setting: No significant medical history  4) Quality: Fatigue  5) Duration: continuous   6) Modifying Factors: No aggravating or alleviating factors  7) Severity: moderate       PAST MEDICAL HISTORY   Patient  has no past medical history on file. PAST SURGICAL HISTORY    Patient  has a past surgical history that includes Tonsillectomy. FAMILY HISTORY    Patient family history is not on file. SOCIAL HISTORY    Patient  reports that she has never smoked.  She has never used smokeless tobacco. She reports that she does no acute change  T Waves: no acute change  Q Waves: none     Clinical Impression: no acute changes and normal EKG    Labs:  CBC:   Recent Labs     10/27/21  1859   WBC 5.5   HGB 13.8        BMP:    Recent Labs     10/27/21  1859      K 3.9      CO2 29   BUN 20   CREATININE 0.86   GLUCOSE 99     S. Calcium:  Recent Labs     10/27/21  1859   CALCIUM 9.4     S. Ionized Calcium:No results for input(s): IONCA in the last 72 hours. S. Magnesium:  Recent Labs     10/27/21  1859   MG 2.0     S. Phosphorus:No results for input(s): PHOS in the last 72 hours. S. Glucose:No results for input(s): POCGLU in the last 72 hours. Glycosylated hemoglobin A1C: No results found for: LABA1C  Hepatic:   Recent Labs     10/27/21  1859   AST 19   ALT 18   ALKPHOS 64     CARDIAC ENZY:   Recent Labs     10/27/21  1859   TROPHS 7     INR: No results for input(s): INR in the last 72 hours. BNP:   Recent Labs     10/27/21  1859   PROBNP 562*      ABGs: No results for input(s): PH, PCO2, PO2, HCO3, O2SAT in the last 72 hours. Lipids: No results for input(s): CHOL, TRIG, HDL, LDLCALC in the last 72 hours. Invalid input(s): LDL  Pancreatic functions:  Recent Labs     10/27/21  1859   LIPASE 15     S. LacticAcid: No results for input(s): LACTA in the last 72 hours. Thyroid functions: No results found for: TSH   U/A:No results for input(s): NITRITE, COLORU, WBCUA, RBCUA, MUCUS, BACTERIA, CLARITYU, SPECGRAV, LEUKOCYTESUR, BLOODU, GLUCOSEU, AMORPHOUS in the last 72 hours. Invalid input(s): [facility]    Imaging/Diagonstics:     CT Head WO Contrast    Result Date: 10/27/2021  EXAMINATION: CT OF THE HEAD WITHOUT CONTRAST  10/27/2021 6:28 pm TECHNIQUE: CT of the head was performed without the administration of intravenous contrast. Dose modulation, iterative reconstruction, and/or weight based adjustment of the mA/kV was utilized to reduce the radiation dose to as low as reasonably achievable. COMPARISON: None available. HISTORY: ORDERING SYSTEM PROVIDED HISTORY: AMS TECHNOLOGIST PROVIDED HISTORY: AMS Decision Support Exception - unselect if not a suspected or confirmed emergency medical condition->Emergency Medical Condition (MA) Reason for Exam: Altered mental status Acuity: Acute Type of Exam: Initial FINDINGS: BRAIN/VENTRICLES: The gyri and sulci have a normal appearance. Ventricles and extra-axial spaces appear normal.  Mild diffuse periventricular and subcortical deep white matter hypoattenuation noted, findings compatible with chronic small vessel ischemic disease. Several punctate remote lacunar infarcts are present throughout the basal ganglia region bilaterally. There is a larger infarct within the caudate head and adjacent anterior left basal ganglia region which may be subacute or chronic. No associated mass effect. The gray-white matter differentiation is otherwise preserved throughout. There is no acute hemorrhage, mass, or mass effect. No evidence of acute territorial infarct. No abnormal extraaxial fluid collections. ORBITS:  The visualized portion of the orbits demonstrate no acute abnormality. SINUSES:  The mastoid air cells are normally aerated. The visualized paranasal sinuses are grossly clear. SOFT TISSUES/SKULL:  No significant abnormality of the visualized skull or soft tissues. No acute fracture. No scalp hematoma. No evidence of acute intracranial process. Mild chronic small vessel ischemic changes noted, along with several punctate remote bilateral basal ganglia region lacunar infarcts and a larger infarct within the anterior left basal ganglia region which may be subacute or chronic. CTA HEAD NECK W CONTRAST    Result Date: 10/27/2021  EXAMINATION: CTA OF THE HEAD AND NECK WITH CONTRAST 10/27/2021 7:29 pm: TECHNIQUE: CTA of the head and neck was performed with the administration of intravenous contrast. Multiplanar reformatted images are provided for review.   MIP images are provided for review. Stenosis of the internal carotid arteries measured using NASCET criteria. Dose modulation, iterative reconstruction, and/or weight based adjustment of the mA/kV was utilized to reduce the radiation dose to as low as reasonably achievable. COMPARISON: CT head 10/27/2021 HISTORY: ORDERING SYSTEM PROVIDED HISTORY: AMS, difficulty with word-finding TECHNOLOGIST PROVIDED HISTORY: AMS, difficulty with word-finding Decision Support Exception - unselect if not a suspected or confirmed emergency medical condition->Emergency Medical Condition (MA) Reason for Exam: AMS, difficulty with word-finding Acuity: Unknown Type of Exam: Unknown FINDINGS: CTA NECK: AORTIC ARCH/ARCH VESSELS: No dissection or arterial injury. No significant stenosis of the brachiocephalic or subclavian arteries. CAROTID ARTERIES: No dissection, arterial injury, or hemodynamically significant stenosis by NASCET criteria. VERTEBRAL ARTERIES: No dissection, arterial injury, or significant stenosis. SOFT TISSUES: The lung apices are clear. No cervical or superior mediastinal lymphadenopathy. The larynx and pharynx are unremarkable. 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 No acute abnormality of the salivary and thyroid glands. BONES: Degenerate change CTA HEAD: ANTERIOR CIRCULATION: No significant stenosis of the intracranial internal carotid, anterior cerebral, or middle cerebral arteries. Small infundibulum at the origin the right PCOM. Fetal origin left PCA. Leatha Raddle Prominent A-comm likely related to aplastic or hypoplastic left A1 segment. POSTERIOR CIRCULATION: No significant stenosis of the vertebral, basilar, or posterior cerebral arteries. No aneurysm. OTHER: No dural venous sinus thrombosis on this non-dedicated study. BRAIN: Evolving left left anterior basal ganglial infarct. Negative for large vessel occlusion or hemodynamic stenosis.  Post mediastinal mass right upper lung, thought to be could represent a neurofibroma versus schwannoma. .  This could be further evaluated with MRI thoracic spine or with and without without contrast for further characterization. ASSESSMENT  and  PLAN     Principal Problem:    AMS (altered mental status)  Active Problems:    Altered mental status    Fatigue    Word finding difficulty    Mass of upper lobe of right lung  Resolved Problems:    * No resolved hospital problems. *    Plan:    AMS/fatigue/dysarthria/right upper lung mass  CT head without contrast shows No evidence of acute intracranial process. Mild chronic small vessel ischemic changes noted, along with several punctate remote bilateral basal ganglia region lacunar infarcts and a larger infarct within the anterior left basal ganglia region which may be subacute or chronic. CTA head neck shows no large vessel occlusion or hemodynamic stenosis. Post mediastinal mass right upper lung, thought to be could represent a neurofibroma versus schwannoma  EKG shows normal sinus rhythm  Awaiting UA  Neurology consult  ER physician spoke with neurologist.  Pallavi Mcwilliams given aspirin 324 mg in the ED  Aspirin 81 mg daily  Lipitor 40 mg nightly  MRI brain in the morning    DVT prophylaxisLovenox 40 mg daily    Consultations:     IP CONSULT TO INTERNAL MEDICINE  IP CONSULT TO NEUROLOGY      REYES Pedroza CNP   10/28/2021  2:10 AM    [address] [facility] [address] Hung Landers [address] [place] [address], [address] [place] [address]. Phone [phone]     Attending Physician Statement  I have discussed the care of Leroy Starks and I have examined the patient myselft and taken ros and hpi , including pertinent history and exam findings,  with the resident. I have reviewed the key elements of all parts of the encounter with the resident. I agree with the assessment, plan and orders as documented by the resident.   57-year-old lady visiting from [place] insidious onset of mental status changes started Saturday not oriented to place and time no fever chills no rash no dysuria no headache no visual disturbances no fall  Patient had a J&J vaccine 1 October  Incidental finding mediastinal mass noted on the CT scan patient does have some dysphagia possible pressure on the esophagus will do MRI thoracic spine with and without contrast  Rule out UTI also urine drug tox screen rule out cerebral venous thrombosis some cases were reported with change of vaccine will order MR V and brain  Case discussed with neurologist will review with above results    Electronically signed by Linda Hernandez MD

## 2021-10-28 NOTE — PROGRESS NOTES
Physical Therapy    Facility/Department: [address] [place] [address] CARE  Initial Assessment    NAME: Yoana Houser  : 1960  MRN: [id number]    Date of Service: 10/28/2021    Discharge Recommendations:  Patient would benefit from continued therapy after discharge   PT Equipment Recommendations  Equipment Needed: No    Assessment   Body structures, Functions, Activity limitations: Decreased functional mobility   Assessment: Pt is Mod I for bed mobility, IND with transfers, SBA for steps, and SBA for walking. Treatment Diagnosis: Altered mental status  Specific instructions for Next Treatment: instruct on coordination exercise and advance to a flight of steps  Prognosis: Good  Decision Making: Medium Complexity  History: Altered mental status  Exam: MMT, ROM, bed mobility, transfers, and steps  Clinical Presentation: Pt is Mod I for bed mobility, IND with transfers, SBA for steps, and SBA for walking. PT Education: Goals;PT Role;Plan of Care;Home Exercise Program;Transfer Training;General Safety;Gait Training;Family Education; Functional Mobility Training; Injury Prevention;Disease Specific Education  Barriers to Learning: none  REQUIRES PT FOLLOW UP: Yes  Activity Tolerance  Activity Tolerance: Patient Tolerated treatment well       Patient Diagnosis(es): The primary encounter diagnosis was Altered mental status, unspecified altered mental status type. Diagnoses of Basal ganglia stroke ([address] and Dysphagia, unspecified type were also pertinent to this visit. has no past medical history on file. has a past surgical history that includes Tonsillectomy.     Restrictions  Restrictions/Precautions  Restrictions/Precautions: General Precautions, Fall Risk, Up as Tolerated (Peripheral IV R anticube)  Required Braces or Orthoses?: No  Implants present? :  (denies)  Position Activity Restriction  Other position/activity restrictions: Up as tolerated,   Vision/Hearing  Vision: Impaired  Vision Exceptions: Wears glasses for reading  Hearing: Exceptions to [facility]  Hearing Exceptions: Hard of hearing/hearing concerns (No hearing in L ear (tenitis))     Subjective  General  Chart Reviewed: Yes  Patient assessed for rehabilitation services?: Yes  Additional Pertinent Hx: Alter mental status  Response To Previous Treatment: Not applicable  Family / Caregiver Present: Yes ( and his brother)  Referring Practitioner: Emmy Wolfe MD  Referral Date : 10/28/21  Diagnosis: Alter mental status  Follows Commands: Within Functional Limits  Other (Comment): Nurse Mahogany notified  Subjective  Subjective: pt seen sitting up with HOB elevated.  and brother in law present. Pt reported she not her normal talkative self, had trouble finding her words.   Pain Screening  Patient Currently in Pain: Denies  Vital Signs  Patient Currently in Pain: Denies       Orientation  Orientation  Overall Orientation Status: Within Functional Limits  Social/Functional History  Social/Functional History  Lives With: Spouse  Type of Home: House  Home Layout: Two level, Bed/Bath upstairs (Full bath also available on main floor)  Home Access: Stairs to enter with rails  Entrance Stairs - Number of Steps: front entrance- 3 steps with no HR, back entrance- 3 steps with L HR, second floor- FF with L HR  Bathroom Shower/Tub: Tub/Shower unit, Doors (Uses upstair shower for morning routine)  Bathroom Toilet: Standard  Bathroom Equipment: Hand-held shower  Home Equipment:  (No DME)  ADL Assistance: Independent  Homemaking Assistance: Independent  Homemaking Responsibilities: Yes  Ambulation Assistance: Independent  Transfer Assistance: Independent  Active : Yes  Mode of Transportation: SUV  Occupation: Full time employment  Type of occupation: ;  and pt own small business  IADL Comments: Sleeps in a flat bad, denies any recent falls  Additional Comments: Brother and wife are retired and able to assist as needed, one daughter works full-time employment,   Cognition        Objective     Observation/Palpation  Posture: Good  Observation: Peripheral IV R anticube    AROM RLE (degrees)  RLE AROM: WFL  AROM LLE (degrees)  LLE AROM : WFL  AROM RUE (degrees)  RUE General AROM: see OT for UE assessment  AROM LUE (degrees)  LUE General AROM: see OT for UE assessment  Strength RLE  Comment: grossly 4/5 with Hip flexors rated at -4/5  Strength LLE  Comment: grossly 4/5 with Hip flexors rated at -4/5  Strength RUE  Comment: see OT for UE assessment  Strength LUE  Comment: see OT for UE assessment     Sensation  Overall Sensation Status: WFL (denies)  Bed mobility  Supine to Sit: Modified independent  Sit to Supine: Modified independent  Transfers  Sit to Stand: Independent (no device used)  Stand to sit:  Independent (no device used)  Ambulation  Ambulation?: Yes  WB Status: full WB  Ambulation 1  Surface: level tile  Device: No Device  Assistance: Stand by assistance  Gait Deviations: None  Distance: 120ft x2  Stairs/Curb  Stairs?: Yes  Stairs  # Steps : 5  Stairs Height: 8\"  Rails: Right ascending  Assistance: Stand by assistance     Balance  Posture: Good  Sitting - Static: Good  Sitting - Dynamic: Good  Standing - Static: Good (no device)  Standing - Dynamic: Good (no device)        Plan   Plan  Times per week: daily 3 times  Times per day:  (daily 3 times)  Specific instructions for Next Treatment: instruct on coordination exercise and advance to a flight of steps  Current Treatment Recommendations: Strengthening, Neuromuscular Re-education, Home Exercise Program, Balance Training, Safety Education & Training, Endurance Training, Patient/Caregiver Education & Training, Functional Mobility Training, Transfer Training, Gait Training, Stair training  Safety Devices  Type of devices: Gait belt, Nurse notified, Call light within reach, Left in bed (Nurse [facility] notified)  Restraints  Initially in place: No    G-Code       OutComes Score

## 2021-10-28 NOTE — PROGRESS NOTES
Patient has arrived to unit via wheelchair and ambulated to unit bed independently. Patient attached to telemetry, vitals taken and assessment completed. Patient oriented to room explained unit protocols. No signs of distress noted at this time will continue to monitor.      Electronically signed by Layton Sanchez RN on 10/27/2021 at 11:25 PM

## 2021-10-28 NOTE — PLAN OF CARE
Problem: HEMODYNAMIC STATUS  Goal: Patient has stable vital signs and fluid balance  Outcome: Ongoing  Note: Will continue to assess and monitor patient vitals and output to ensure hemodynamic status is remaining stable. Patient BP elevated however due to possibly stroke wants to remain more on the elevated side.      Problem: ACTIVITY INTOLERANCE/IMPAIRED MOBILITY  Goal: Mobility/activity is maintained at optimum level for patient  Outcome: Met This Shift  Note: Patient is completely independent     Problem: COMMUNICATION IMPAIRMENT  Goal: Ability to express needs and understand communication  Outcome: Met This Shift  Note: Patient is completely independent

## 2021-10-28 NOTE — PROCEDURES
INSTRUMENTAL SWALLOW REPORT  MODIFIED BARIUM SWALLOW    NAME: Lang López   : 1960  MRN: [id number]       Date of Eval: 10/28/2021              Referring Diagnosis(es):  dysphagia    Past Medical History:  has no past medical history on file. Past Surgical History:  has a past surgical history that includes Tonsillectomy. Current Diet Solid Consistency: NPO  Current Diet Liquid Consistency: NPO       Type of Study: Initial MBS       Recent CXR/CT of Chest: Date n/a    Patient Complaints/Reason for Referral:  Lang López was referred for a MBS to assess the efficiency of his/her swallow function, assess for aspiration, and to make recommendations regarding safe dietary consistencies, effective compensatory strategies, and safe eating environment. Onset of problem:    Per IM physician H&P:The patient is a 59 y.o.  female, with no significant medical history. Patient does not take any prescribed medications. Patient did start taking a herbal supplement recently but cannot recall its name. Patient brought to the ER by her  for evaluation of fatigue, confusion, difficulty finding words and withdrawn.  states that he noticed patient symptoms 4 days ago. Patient denies headache, visual disturbance, nausea, abdominal pain, chest pain, cough, shortness of breath, dysuria, fever or chills. She does not smoke. She does drink 1 glass of wine per day. Denies drug use.  states him and his wife recently traveled from [place] to visit family in this area      Pt. And her family report that pt. Vomited/choked when drinking water this am (\"2-3 minutes after drinking it. \"). Pt.  reports other instances of pt. Choking the last couple of days. Behavior/Cognition/Vision/Hearing:  Behavior/Cognition: Alert; Cooperative  Vision: Impaired  Vision Exceptions: Wears glasses for reading  Hearing: Exceptions to [facility]  Hearing Exceptions: Hard of hearing/hearing concerns    Impressions:     Patient Position: Lateral       Consistencies Administered: Dysphagia Soft and Bite-Sized (Dysphagia III); Pudding teaspoon;Nectar cup; Thin cup; Thin straw (Pt. refused regular solid consistency (pamella cracker) as she is on a gluetin free diet and there was no gluetin free substitue immediately available)              Oral Phase: premature vallecular spillage              Dysphagia Outcome Severity Scale: Level 7: Normal in all situations       Recommended Diet:  Solid consistency: Regular  Liquid consistency: Thin            Safe Swallow Protocol:     Compensatory Swallowing Strategies: Eat/Feed slowly;Upright as possible for all oral intake;Small bites/sips              Recommendations/Treatment  Requires SLP Intervention: No                       Education: results and recommendations were reviewed with pt., her  and her brother in law following this exam.   Patient Education: Pt.  and brother in law present, verbalized  understanding of education. Patient Education Response: Verbalizes understanding                                 Oral Preparation / Oral Phase  Oral Phase: WNL        Pharyngeal Phase  Pharyngeal Phase: WNL      Esophageal Phase  Esophageal Screen: WNL        Pain   Patient Currently in Pain: Denies  Pain Level: 0      Therapy Time:   Individual Concurrent Group Co-treatment   Time In 7845         Time Out 1535         Minutes [address] [facility] [address] M[address] A.CCC/SLP       10/28/2021, 3:34 PM

## 2021-10-28 NOTE — PLAN OF CARE
Please fill out the MRI Screening form and fax to dept @ [phone]. Any questions. .please call [facility] MRI @ [phone]. MRI exam will be scheduled after receiving the completed screening form.  Thank you!!

## 2021-10-28 NOTE — PROGRESS NOTES
[address]   Occupational Therapy Evaluation  Date: 10/28/21  Patient Name: Evy Maxwell       Room:   MRN: [id number]  Account: [de-identified]   : 1960  (59 y.o.) Gender: female     Discharge Recommendations: The patient may need non-skilled ADL assistance after discharge. Equipment Needed:  (TBD)    Referring Practitioner: Linda Hernandez MD  Diagnosis: Altered mental status  Additional Pertinent Hx: 59 y.o.  female, with no significant medical history. Patient does not take any prescribed medications. Patient did start taking a herbal supplement recently but cannot recall its name. Patient brought to the ER by her  for evaluation of fatigue, confusion, difficulty finding words and withdrawn.  states that he noticed patient symptoms 4 days ago. Patient denies headache, visual disturbance, nausea, abdominal pain, chest pain, cough, shortness of breath, dysuria, fever or chills. Treatment Diagnosis: Impaired self-care status  Past Medical History:  has no past medical history on file. Past Surgical History:   has a past surgical history that includes Tonsillectomy. Restrictions  Restrictions/Precautions: General Precautions, Fall Risk, Up as Tolerated (Peripheral IV R anticube)  Implants present? :  (denies)  Other position/activity restrictions: Up as tolerated,   Required Braces or Orthoses?: No     Vitals  Temp: 98.2 °F (36.8 °C)  Pulse: 68  Resp: 16  BP: (!) 150/73  Height: 5' 5\" (165.1 cm)  Weight: 130 lb (59 kg)  BMI (Calculated): 21.7  Oxygen Therapy  SpO2: 94 %  Pulse Oximeter Device Mode: Intermittent  Pulse Oximeter Device Location: Finger  O2 Device: None (Room air)  Level of Consciousness: Alert (0)    Subjective  Comments: Okay for PT/OT per RN Nadia Wallace. Pt pleasant and agreeable for today's session.   Overall Orientation Status: Impaired  Orientation Level: Oriented to place, Oriented to situation, Oriented to person, Disoriented to time (incorrect month)  Vision  Vision: Impaired  Vision Exceptions: Wears glasses for reading  Hearing  Hearing: Exceptions to [facility]  Hearing Exceptions: Hard of hearing/hearing concerns  Social/Functional History  Lives With: Spouse  Type of Home: House  Home Layout: Two level, Bed/Bath upstairs (Full bath also available on main floor)  Home Access: Stairs to enter with rails  Entrance Stairs - Number of Steps: front entrance- 3 steps with no HR, back entrance- 3 steps with L HR, second floor- FF with L HR  Bathroom Shower/Tub: Tub/Shower unit, Doors (Uses upstair shower for morning routine)  Bathroom Toilet: Standard  Bathroom Equipment: Hand-held shower  Home Equipment:  (No DME)  ADL Assistance: Independent  Homemaking Assistance: Independent  Homemaking Responsibilities: Yes  Ambulation Assistance: Independent  Transfer Assistance: Independent  Active : Yes  Mode of Transportation: [facility]  Occupation: Full time employment  Type of occupation: ;  and pt own small business  IADL Comments: Sleeps in a flat bad, denies any recent falls  Additional Comments: Brother and wife are retired and able to assist as needed, one daughter works full-time employment,        Objective      Cognition  Overall Cognitive Status: Impaired  Memory: Decreased recall of recent events   Sensation  Overall Sensation Status: WFL (denies)   ADL  Feeding: NPO  Grooming: Modified independent   UE Bathing: Supervision  LE Bathing: Supervision  UE Dressing: Modified independent   LE Dressing: Modified independent   Toileting: Supervision  Additional Comments: ADL scores based on skilled observation and clinical reasoning, unless otherwise noted. Pt donned/doffed socks seated EOB.  Assistance required for safety     UE Function           LUE Strength  L Hand General: 4/5  LUE Strength Comment: Overall 4/5     LUE Tone: Normotonic     LUE AROM (degrees)  LUE AROM : WFL     Left Hand AROM (degrees)  Left Hand AROM: WFL  RUE Strength  Gross RUE Strength: WFL  R Hand General: 4/5  RUE Strength Comment: Overall 4/5      RUE Tone: Normotonic     RUE AROM (degrees)  RUE AROM : WFL     Right Hand AROM (degrees)  Right Hand AROM: WFL    Fine Motor Skills  Coordination  Movements Are Fluid And Coordinated: Yes                           Mobility  Supine to Sit: Modified independent  Sit to Supine: Modified independent       Balance  Sitting Balance: Independent  Standing Balance: Stand by assistance  Standing Balance  Time: ~5 minutes  Activity: transfers, functional mobility  Functional Mobility  Functional - Mobility Device: No device  Activity:  (In hallway)  Assist Level: Stand by assistance  Bed mobility  Supine to Sit: Modified independent  Sit to Supine: Modified independent  Scooting: Independent     Transfers  Sit to stand: Independent  Stand to sit: Independent  Functional Activity Tolerance  Functional Activity Tolerance:  Tolerates 30 min exercise with multiple rests     Assessment  Assessment  Performance deficits / Impairments: Decreased functional mobility , Decreased ADL status, Decreased strength, Decreased cognition, Decreased endurance, Decreased high-level IADLs  Treatment Diagnosis: Impaired self-care status  Prognosis: Good  Decision Making: Medium Complexity  REQUIRES OT FOLLOW UP: Yes  Discharge Recommendations: Patient would benefit from continued therapy after discharge  Activity Tolerance: Patient Tolerated treatment well         Functional Outcome Measures  AM-PAC Daily Activity Inpatient   How much help for putting on and taking off regular lower body clothing?: A Little  How much help for Bathing?: A Little  How much help for Toileting?: A Little  How much help for putting on and taking off regular upper body clothing?: A Little  How much help for taking care of personal grooming?: None  How much help for eating meals?: None  AM-[facility] Inpatient Daily Activity Raw Score: 20  AM-PAC Inpatient ADL T-Scale Score : 42.03  ADL Inpatient CMS 0-100% Score: 38.32  ADL Inpatient CMS G-Code Modifier : CJ       Goals  Short term goals  Time Frame for Short term goals: By discharge  Short term goal 1: Patient will independently perform BADLs with good safety  Short term goal 2: Patient will perform all transfers/functional activity to promote independence with self care and mobility  Short term goal 3: Patient will actively participate in therapeutic exercise/functional activity to promote independence with self care and mobility    Plan  Safety Devices  Safety Devices in place: Yes  Type of devices:  All fall risk precautions in place, Call light within reach, Gait belt, Left in bed, Nurse notified     Plan  Times per week: 1-2 sessions  Specific instructions for Next Treatment: Assess safety and ability for patient to complete morning routine/sequence her self care tasks due to cognitive deficits  Current Treatment Recommendations: Strengthening, Functional Mobility Training, Balance Training, Endurance Training, Patient/Caregiver Education & Training, Equipment Evaluation, Education, & procurement, Self-Care / ADL       Equipment Recommendations  Equipment Needed:  (TBD)  OT Individual Minutes  Time In: 0591  Time Out: (P) [id number]  Minutes: (P) 30    Electronically signed by Barbara Pepper OT on 10/28/21 at 4:41 PM EDT         10/28/21 1640   OT Individual Minutes   Time In 7675   Time Out 1161   [id number]

## 2021-10-28 NOTE — PROGRESS NOTES
Speech Language Pathology  Facility/Department: [facility] PROGRESSIVE CARE  Initial Speech/Language/Cognitive Assessment    NAME: Dariel Anderson  : 1960   MRN: [id number]  ADMISSION DATE: 10/27/2021  ADMITTING DIAGNOSIS: has Altered mental status; AMS (altered mental status); Fatigue; Word finding difficulty; and Mass of upper lobe of right lung on their problem list.    Date of Eval: 10/28/2021   Evaluating Therapist: JACOB Chen    RECENT RESULTS  CT OF HEAD/MRI:   10/27- CT brain-   No evidence of acute intracranial process.  Mild chronic small vessel   ischemic changes noted, along with several punctate remote bilateral basal   ganglia region lacunar infarcts and a larger infarct within the anterior left   basal ganglia region which may be subacute or chronic. Primary Complaint:   Per IM physician H&P: The patient is a 59 y.o.  female, with no significant medical history. Patient does not take any prescribed medications. Patient did start taking a herbal supplement recently but cannot recall its name. Patient brought to the ER by her  for evaluation of fatigue, confusion, difficulty finding words and withdrawn.  states that he noticed patient symptoms 4 days ago. Patient denies headache, visual disturbance, nausea, abdominal pain, chest pain, cough, shortness of breath, dysuria, fever or chills. She does not smoke. She does drink 1 glass of wine per day. Denies drug use.  states him and his wife recently traveled from [place] to visit family in this area. Pain:  Pain Assessment  Pain Assessment: 0-10  Pain Level: 0    Assessment:      Diagnosis: Pt. demonstrated functional comprehension and cognition. Mild expressive aphasia noted (nonfluent) during conversation and complex expression tasks. Treatment for expressive language is warranted to bring function to premorbid level.     Recommendations:  Requires SLP Intervention: Yes     D/C Recommendations: Outpatient Plan:   Goals:  Short-term Goals  Goal 1: Increase word finding to a functional level in conversation and divergent naming tasks  to 90%   Patient/family involved in developing goals and treatment plan: yes    Subjective:   Previous level of function and limitations: independent        Vision  Vision: Impaired  Vision Exceptions: Wears glasses for reading  Hearing  Hearing: Exceptions to [facility]  Hearing Exceptions: Hard of hearing/hearing concerns           Objective:     Oral/Motor  Oral Motor: Within functional limits    Auditory Comprehension  Comprehension: Within Functional Limits         Expression  Primary Mode of Expression: Verbal    Verbal Expression  Verbal Expression: Exceptions to functional limits  Convergent: Mild  Divergent: Mild  Conversation: Mild         Motor Speech  Motor Speech: Within Functional Limits         Cognition:      Orientation  Overall Orientation Status: Within Normal Limits  Attention  Attention: Within Functional Limits  Memory  Memory: Within Funtional Limits  Problem Solving  Problem Solving: Within Functional Limits  Abstract Reasoning  Abstract Reasoning: Within Functional Limits  Safety/Judgement  Safety/Judgement: Within Functional Limits    Education:  Patient Education: Pt.  and brother in law present, verbalized  understanding of education. Patient Education Response: Verbalizes understanding          Therapy Time:   Individual Concurrent Group Co-treatment   Time In 0699         Time Out 1450         Minutes [address] [facility] A.CCC/SLP    10/28/2021 3:50 PM

## 2021-10-29 ENCOUNTER — APPOINTMENT (OUTPATIENT)
Dept: NON INVASIVE DIAGNOSTICS | Age: 61
DRG: 064 | End: 2021-10-29
Payer: COMMERCIAL

## 2021-10-29 VITALS
TEMPERATURE: 98.6 F | BODY MASS INDEX: 21.66 KG/M2 | OXYGEN SATURATION: 97 % | DIASTOLIC BLOOD PRESSURE: 83 MMHG | HEART RATE: 62 BPM | HEIGHT: 65 IN | WEIGHT: 130 LBS | RESPIRATION RATE: 16 BRPM | SYSTOLIC BLOOD PRESSURE: 132 MMHG

## 2021-10-29 PROBLEM — I63.513 CEREBROVASCULAR ACCIDENT (CVA) DUE TO BILATERAL OCCLUSION OF MIDDLE CEREBRAL ARTERIES (HCC): Status: ACTIVE | Noted: 2021-10-29

## 2021-10-29 LAB
ALBUMIN SERPL-MCNC: 4 G/DL (ref 3.5–5.2)
ALBUMIN/GLOBULIN RATIO: ABNORMAL (ref 1–2.5)
ALP BLD-CCNC: 52 U/L (ref 35–104)
ALT SERPL-CCNC: 13 U/L (ref 5–33)
ANION GAP SERPL CALCULATED.3IONS-SCNC: 9 MMOL/L (ref 9–17)
AST SERPL-CCNC: 13 U/L
BILIRUB SERPL-MCNC: 0.78 MG/DL (ref 0.3–1.2)
BUN BLDV-MCNC: 16 MG/DL (ref 8–23)
BUN/CREAT BLD: ABNORMAL (ref 9–20)
CALCIUM SERPL-MCNC: 9.3 MG/DL (ref 8.6–10.4)
CHLORIDE BLD-SCNC: 104 MMOL/L (ref 98–107)
CO2: 29 MMOL/L (ref 20–31)
CREAT SERPL-MCNC: 0.74 MG/DL (ref 0.5–0.9)
GFR AFRICAN AMERICAN: >60 ML/MIN
GFR NON-AFRICAN AMERICAN: >60 ML/MIN
GFR SERPL CREATININE-BSD FRML MDRD: ABNORMAL ML/MIN/{1.73_M2}
GFR SERPL CREATININE-BSD FRML MDRD: ABNORMAL ML/MIN/{1.73_M2}
GLUCOSE BLD-MCNC: 97 MG/DL (ref 70–99)
HCT VFR BLD CALC: 40.5 % (ref 36–46)
HEMOGLOBIN: 13.6 G/DL (ref 12–16)
LV EF: 63 %
LVEF MODALITY: NORMAL
MCH RBC QN AUTO: 31.5 PG (ref 26–34)
MCHC RBC AUTO-ENTMCNC: 33.6 G/DL (ref 31–37)
MCV RBC AUTO: 93.7 FL (ref 80–100)
NRBC AUTOMATED: NORMAL PER 100 WBC
PDW BLD-RTO: 13.2 % (ref 11.5–14.9)
PLATELET # BLD: 185 K/UL (ref 150–450)
PMV BLD AUTO: 9.2 FL (ref 6–12)
POTASSIUM SERPL-SCNC: 3.6 MMOL/L (ref 3.7–5.3)
RBC # BLD: 4.33 M/UL (ref 4–5.2)
SODIUM BLD-SCNC: 142 MMOL/L (ref 135–144)
TOTAL PROTEIN: 5.9 G/DL (ref 6.4–8.3)
WBC # BLD: 5 K/UL (ref 3.5–11)

## 2021-10-29 PROCEDURE — 97116 GAIT TRAINING THERAPY: CPT

## 2021-10-29 PROCEDURE — 92526 ORAL FUNCTION THERAPY: CPT

## 2021-10-29 PROCEDURE — 97530 THERAPEUTIC ACTIVITIES: CPT

## 2021-10-29 PROCEDURE — 6370000000 HC RX 637 (ALT 250 FOR IP): Performed by: INTERNAL MEDICINE

## 2021-10-29 PROCEDURE — 99239 HOSP IP/OBS DSCHRG MGMT >30: CPT | Performed by: INTERNAL MEDICINE

## 2021-10-29 PROCEDURE — 6370000000 HC RX 637 (ALT 250 FOR IP): Performed by: PSYCHIATRY & NEUROLOGY

## 2021-10-29 PROCEDURE — 6360000002 HC RX W HCPCS: Performed by: NURSE PRACTITIONER

## 2021-10-29 PROCEDURE — 2580000003 HC RX 258: Performed by: NURSE PRACTITIONER

## 2021-10-29 PROCEDURE — 93306 TTE W/DOPPLER COMPLETE: CPT

## 2021-10-29 PROCEDURE — 85027 COMPLETE CBC AUTOMATED: CPT

## 2021-10-29 PROCEDURE — 99232 SBSQ HOSP IP/OBS MODERATE 35: CPT | Performed by: PSYCHIATRY & NEUROLOGY

## 2021-10-29 PROCEDURE — 36415 COLL VENOUS BLD VENIPUNCTURE: CPT

## 2021-10-29 PROCEDURE — 97110 THERAPEUTIC EXERCISES: CPT

## 2021-10-29 PROCEDURE — 80053 COMPREHEN METABOLIC PANEL: CPT

## 2021-10-29 RX ORDER — CIPROFLOXACIN 500 MG/1
500 TABLET, FILM COATED ORAL EVERY 12 HOURS SCHEDULED
Qty: 4 TABLET | Refills: 0 | Status: ON HOLD | OUTPATIENT
Start: 2021-10-29 | End: 2021-11-01 | Stop reason: HOSPADM

## 2021-10-29 RX ORDER — LISINOPRIL 10 MG/1
10 TABLET ORAL DAILY
Qty: 90 TABLET | Refills: 0 | Status: ON HOLD | OUTPATIENT
Start: 2021-10-30 | End: 2021-11-01 | Stop reason: HOSPADM

## 2021-10-29 RX ORDER — ATORVASTATIN CALCIUM 40 MG/1
40 TABLET, FILM COATED ORAL NIGHTLY
Qty: 90 TABLET | Refills: 1 | Status: ON HOLD | OUTPATIENT
Start: 2021-10-29 | End: 2021-11-01 | Stop reason: SDUPTHER

## 2021-10-29 RX ORDER — CLOPIDOGREL BISULFATE 75 MG/1
75 TABLET ORAL DAILY
Status: DISCONTINUED | OUTPATIENT
Start: 2021-10-29 | End: 2021-10-29 | Stop reason: HOSPADM

## 2021-10-29 RX ORDER — ASPIRIN 81 MG/1
81 TABLET ORAL DAILY
Status: DISCONTINUED | OUTPATIENT
Start: 2021-10-30 | End: 2021-10-29 | Stop reason: HOSPADM

## 2021-10-29 RX ORDER — ATORVASTATIN CALCIUM 80 MG/1
80 TABLET, FILM COATED ORAL NIGHTLY
Status: DISCONTINUED | OUTPATIENT
Start: 2021-10-29 | End: 2021-10-29 | Stop reason: HOSPADM

## 2021-10-29 RX ORDER — ASPIRIN 81 MG/1
81 TABLET ORAL DAILY
Qty: 30 TABLET | Refills: 3 | Status: SHIPPED | OUTPATIENT
Start: 2021-10-30 | End: 2021-10-29

## 2021-10-29 RX ORDER — CLOPIDOGREL BISULFATE 75 MG/1
75 TABLET ORAL DAILY
Qty: 30 TABLET | Refills: 3 | Status: ON HOLD | OUTPATIENT
Start: 2021-10-30 | End: 2021-11-01 | Stop reason: HOSPADM

## 2021-10-29 RX ORDER — ASPIRIN 81 MG/1
81 TABLET ORAL DAILY
Qty: 30 TABLET | Refills: 3 | Status: ON HOLD | OUTPATIENT
Start: 2021-10-30 | End: 2021-11-01 | Stop reason: SDUPTHER

## 2021-10-29 RX ORDER — CLOPIDOGREL BISULFATE 75 MG/1
75 TABLET ORAL DAILY
Qty: 30 TABLET | Refills: 3 | Status: SHIPPED | OUTPATIENT
Start: 2021-10-30 | End: 2021-10-29

## 2021-10-29 RX ORDER — ASPIRIN 81 MG/1
81 TABLET ORAL DAILY
Qty: 30 TABLET | Refills: 3 | Status: CANCELLED | OUTPATIENT
Start: 2021-10-30

## 2021-10-29 RX ORDER — CIPROFLOXACIN 500 MG/1
500 TABLET, FILM COATED ORAL EVERY 12 HOURS SCHEDULED
Qty: 4 TABLET | Refills: 0 | Status: SHIPPED | OUTPATIENT
Start: 2021-10-29 | End: 2021-10-29

## 2021-10-29 RX ORDER — ATORVASTATIN CALCIUM 40 MG/1
40 TABLET, FILM COATED ORAL NIGHTLY
Qty: 90 TABLET | Refills: 1 | Status: SHIPPED | OUTPATIENT
Start: 2021-10-29 | End: 2021-10-29

## 2021-10-29 RX ORDER — LISINOPRIL 20 MG/1
20 TABLET ORAL DAILY
Qty: 30 TABLET | Refills: 3 | Status: CANCELLED | OUTPATIENT
Start: 2021-10-30

## 2021-10-29 RX ORDER — LISINOPRIL 10 MG/1
10 TABLET ORAL DAILY
Qty: 90 TABLET | Refills: 0 | Status: SHIPPED | OUTPATIENT
Start: 2021-10-30 | End: 2021-10-29

## 2021-10-29 RX ORDER — CIPROFLOXACIN 500 MG/1
500 TABLET, FILM COATED ORAL EVERY 12 HOURS SCHEDULED
Qty: 5 TABLET | Refills: 0 | Status: CANCELLED | OUTPATIENT
Start: 2021-10-29 | End: 2021-11-01

## 2021-10-29 RX ORDER — ATORVASTATIN CALCIUM 80 MG/1
80 TABLET, FILM COATED ORAL NIGHTLY
Qty: 30 TABLET | Refills: 3 | Status: CANCELLED | OUTPATIENT
Start: 2021-10-29

## 2021-10-29 RX ORDER — CLOPIDOGREL BISULFATE 75 MG/1
75 TABLET ORAL DAILY
Qty: 30 TABLET | Refills: 3 | Status: CANCELLED | OUTPATIENT
Start: 2021-10-29

## 2021-10-29 RX ADMIN — LISINOPRIL 20 MG: 20 TABLET ORAL at 15:59

## 2021-10-29 RX ADMIN — SODIUM CHLORIDE, PRESERVATIVE FREE 10 ML: 5 INJECTION INTRAVENOUS at 09:00

## 2021-10-29 RX ADMIN — ENOXAPARIN SODIUM 40 MG: 40 INJECTION SUBCUTANEOUS at 09:59

## 2021-10-29 RX ADMIN — ASPIRIN 325 MG: 325 TABLET, COATED ORAL at 09:58

## 2021-10-29 RX ADMIN — CIPROFLOXACIN 500 MG: 500 TABLET, FILM COATED ORAL at 14:26

## 2021-10-29 RX ADMIN — CLOPIDOGREL BISULFATE 75 MG: 75 TABLET ORAL at 14:26

## 2021-10-29 ASSESSMENT — PAIN SCALES - GENERAL
PAINLEVEL_OUTOF10: 0

## 2021-10-29 NOTE — CARE COORDINATION
ONGOING DISCHARGE PLAN:    Patient is currently taking a shower. Spoke with patient's , regarding discharge plan and he confirms that plan is still to return to home w/ no needs. They are here from [place], visiting Family. Neuro saw, Per Notes, ASA, Plavix, Statin & Lisinopril on DC.  would like paper scripts for him to take to the pharmacy of their choice. Nursing aware. Will continue to follow for additional discharge needs.     Electronically signed by Kindra Payne RN on 10/29/2021 at 2:51 PM

## 2021-10-29 NOTE — PROGRESS NOTES
[address] [facility]    INPATIENT OCCUPATIONAL THERAPY  PROGRESS NOTE  Date: 10/29/2021  Patient Name: Natasha Wilburn      Room: -01  MRN: [id number]    : 1960  (62 y.o.) Gender: female     Discharge Recommendations:  Further Occupational Therapy is recommended upon facility discharge. Equipment Needed:  (TBD)    Referring Practitioner: Emmy Wolfe MD  Diagnosis: Altered mental status  General  Chart Reviewed: Yes  Patient assessed for rehabilitation services?: Yes  Additional Pertinent Hx: 59 y.o.  female, with no significant medical history. Patient does not take any prescribed medications. Patient did start taking a herbal supplement recently but cannot recall its name. Patient brought to the ER by her  for evaluation of fatigue, confusion, difficulty finding words and withdrawn.  states that he noticed patient symptoms 4 days ago. Patient denies headache, visual disturbance, nausea, abdominal pain, chest pain, cough, shortness of breath, dysuria, fever or chills. Family / Caregiver Present: Yes (, brother)  Referring Practitioner: Emmy Wolfe MD  Diagnosis: Altered mental status    Restrictions  Restrictions/Precautions: General Precautions, Fall Risk, Up as Tolerated (Peripheral IV R anticube)  Implants present? :  (denies)  Other position/activity restrictions: Up as tolerated,   Required Braces or Orthoses?: No      Subjective  Subjective: Pt reports coughing this AM with drinkining thin liquids. Reports no other ADL concerns at this time. Comments: Pt with son and spouse at bedside. Reports no ADL/ADL transfer concerns. Are concerned with swallowing; writer review OT POC/scope of practice, provided positioning strategies, encouraged follow up with speech and transfer training.   Patient Currently in Pain: Denies             Objective  Cognition  Overall Cognitive Status: Impaired  Memory: Decreased recall of recent events  Bed mobility  Supine to Sit: Modified independent  Sit to Supine: Modified independent  Scooting: Independent  Balance  Sitting Balance: Independent  Standing Balance: Supervision  Standing Balance  Comment: brief stand pivot to bedside chair      ADL  Feeding: NPO  Grooming: Modified independent   UE Bathing: Modified independent   LE Bathing: Modified independent   UE Dressing: Modified independent   LE Dressing: Modified independent   Toileting: Modified independent   Additional Comments: ADL scores based on skilled observation and clinical reasoning, unless otherwise noted. Pt/family reports no additional ADL needs. Writer did encourage good posture and seated positioning, thorough chewing. OT POC otherwise. Transfers  Sit to stand: Supervision  Stand to sit: Supervision  Transfer Comments: inconsistent use of proper hand placement; verbal review on hand placement/safety with seated surfaces moving upon transfers. Assessment  Performance deficits / Impairments: Decreased functional mobility ; Decreased ADL status; Decreased strength;Decreased cognition;Decreased endurance;Decreased high-level IADLs  Assessment: DC from OT  Prognosis: Good  Specific instructions for Next Treatment: Assess safety and ability for patient to complete morning routine/sequence her self care tasks due to cognitive deficits  Activity Tolerance: Patient Tolerated treatment well  Safety Devices in place: Yes  Type of devices: All fall risk precautions in place;Call light within reach;Gait belt;Nurse notified; Left in chair  Equipment Recommendations  Equipment Needed:  (TBD)          Patient Education:    noted above  Learner:family, patient and significant other  Method: demonstration and explanation       Outcome: demonstrated understanding     Plan  Safety Devices  Safety Devices in place: Yes  Type of devices:  All fall risk precautions in place, Call light within reach, Gait belt, Nurse notified, Left in chair  Plan  Times per week: 1-2 sessions  Specific instructions for Next Treatment: Assess safety and ability for patient to complete morning routine/sequence her self care tasks due to cognitive deficits  Current Treatment Recommendations: Strengthening, Functional Mobility Training, Balance Training, Endurance Training, Patient/Caregiver Education & Training, Equipment Evaluation, Education, & procurement, Self-Care / ADL      Goals  Short term goals  Time Frame for Short term goals: By discharge  Short term goal 1: Patient will independently perform BADLs with good safety  Short term goal 2: Patient will perform all transfers/functional activity to promote independence with self care and mobility  Short term goal 3: Patient will actively participate in therapeutic exercise/functional activity to promote independence with self care and mobility    OT Individual Minutes  Time In: 1112  Time Out: 825 Tar[address] LAMAS  Minutes: 9      Electronically signed by PEPE Vega on 10/29/21 at 2:44 PM EDT     t

## 2021-10-29 NOTE — PROGRESS NOTES
Hospital course: This is a 58 y/o WF with no significant reported PMH history who presented subacutely with some confusion and alteration in level of alertness and her head CT revealed some evidence of subacute and chronic strokes which was confirmed on her MRI. Today she reports she feels better but was having some coughing and choking earlier with eating. History reviewed. No pertinent past medical history. Past Surgical History:   Procedure Laterality Date    TONSILLECTOMY         History reviewed. No pertinent family history. Social History     Socioeconomic History    Marital status:      Spouse name: None    Number of children: None    Years of education: None    Highest education level: None   Occupational History    None   Tobacco Use    Smoking status: Never Smoker    Smokeless tobacco: Never Used   Substance and Sexual Activity    Alcohol use: None    Drug use: Never    Sexual activity: None   Other Topics Concern    None   Social History Narrative    None     Social Determinants of Health     Financial Resource Strain:     Difficulty of Paying Living Expenses:    Food Insecurity:     Worried About Running Out of Food in the Last Year:     [address] in the Last Year:    Transportation Needs:     Lack of Transportation (Medical):      Lack of Transportation (Non-Medical):    Physical Activity:     Days of Exercise per Week:     Minutes of Exercise per Session:    Stress:     Feeling of Stress :    Social Connections:     Frequency of Communication with Friends and Family:     Frequency of Social Gatherings with Friends and Family:     Attends [employer] Services:     Active Member of Clubs or Organizations:     Attends Club or Organization Meetings:     Marital Status:    Intimate Partner Violence:     Fear of Current or Ex-Partner:     Emotionally Abused:     Physically Abused:     Sexually Abused:        Current Facility-Administered Medications Medication Dose Route Frequency Provider Last Rate Last Admin    clopidogrel (PLAVIX) tablet 75 mg  75 mg Oral Daily Darci Browning MD        [START ON 10/30/2021] aspirin EC tablet 81 mg  81 mg Oral Daily Dacri Browning MD        atorvastatin (LIPITOR) tablet 80 mg  80 mg Oral Nightly Darci Browning MD        lisinopril (PRINIVIL;ZESTRIL) tablet 20 mg  20 mg Oral Daily Darci Browning MD        ciprofloxacin (CIPRO) tablet 500 mg  500 mg Oral 2 times per day Alfredo Chacko MD   500 mg at 10/28/21 2250    sodium chloride flush 0.9 % injection 10 mL  10 mL IntraVENous PRN Dorina Morrison MD   10 mL at 10/28/21 1856    0.9 % sodium chloride bolus  1,000 mL IntraVENous Once Violeta Quarles MD        sodium chloride flush 0.9 % injection 10 mL  10 mL IntraVENous PRN Violeta Quarles MD   10 mL at 10/27/21 2006    sodium chloride flush 0.9 % injection 10 mL  10 mL IntraVENous 2 times per day Valencia Oppenheim, APRN - CNP   10 mL at 10/29/21 0900    sodium chloride flush 0.9 % injection 10 mL  10 mL IntraVENous PRN Valencia Oppenheim, APRN - CNP        0.9 % sodium chloride infusion  25 mL IntraVENous PRN Valencia Oppenheim, APRN - CNP        enoxaparin (LOVENOX) injection 40 mg  40 mg SubCUTAneous Daily Valencia Oppenheim, APRN - CNP   40 mg at 10/29/21 0959    ondansetron (ZOFRAN) injection 4 mg  4 mg IntraVENous Q6H PRN Valencia Oppenheim, APRN - CNP           Allergies   Allergen Reactions    Pcn [Penicillins]     Suprax [Cefixime]        Vitals:    10/29/21 0814   BP: (!) 126/57   Pulse: 66   Resp: 16   Temp: 97.4 °F (36.3 °C)   SpO2: 96%     Admission weight: 130 lb (59 kg)      General Exam:  Normal body habitus, no acute distress     HEENT:  Normocephalic, atraumatic  Eyes: conjunctiva non-injected, sclera anicteric  Mucous membranes of normal color and hydration status           Neurologic exam:      Mental status: alert and oriented to person, place, time and situation but speech is a little slow at times.    No overt visuospatial neglect or gaze preference  Language with normal fluency and comprehension to simple commands.     Cranial nerves:  Pupils equal round and reactive to light, no eyelid ptosis  Extraocular movements: intact and full. There is some mild right lower facial weakness  Hearing is intact to conversation  Tongue midline, no dysarthria or dysphonia     Assessment : This is a 60 y/o with no reported past medical history who presented with subacute alteration in mental status characterized by some malaise and word finding issues and confusion. Her work-up reveals 2 older lacunar strokes and one recent and fairly large left basal ganglia lacunar stroke. She denies any history of HTN but her blood pressures were rather high on admission and most of this hospitalization. The nature of these strokes suggests these are/were the result of small vessel disease and not due to any embolic process. I suspect she has had hypertension for awhile and this is the likely culprit for these strokes. 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 Plan:  1. OK for d/c from neurologic standpoint assuming TTE is essentially normal.  2. -325mg/day and plavix 75mg for 3 weeks  3. Close monitoring of blood pressure  4. Statin and lisinopril on DC    D/w dr Javier Jorge at [address]. Yohan Smith M.D.   Clinical Neurophysiologist  Neuromuscular Medicine

## 2021-10-29 NOTE — PROGRESS NOTES
MRI noted incidental finding of left basal ganglia ischemic stroke acute on subacute and some other areas of old stroke noted Case discussed at length and MRI images reviewed with the neurologist Dr. Umang Yu  Likely small vessel disease secondary to hypertension  Start aspirin Lipitor Plavix echocardiogram pending  Probable UTI WBC 10 RBC in the urine cultures pending continue Cipro  Acute toxic metabolic encephalopathy resolving  Discharge planning afebrile echo is negative later today  Imelda Suarez MD  10/29/2021

## 2021-10-29 NOTE — PROGRESS NOTES
Speech therapy was contacted re: pt's and her 's concern re:  coughing with eating ; also xray dept re: obtaining CDs of MRI,xray, and CTs taken during this admission to send with them  as pt and  returning to [place] after discharge

## 2021-10-29 NOTE — PROGRESS NOTES
Physical Therapy  Facility/Department: [facility] PROGRESSIVE CARE  Daily Treatment Note  NAME: Layla Duran  : 1960  MRN: [id number]    Date of Service: 10/29/2021    Discharge Recommendations:  Patient would benefit from continued therapy after discharge   PT Equipment Recommendations  Equipment Needed: No    Assessment   Body structures, Functions, Activity limitations: Decreased functional mobility   Treatment Diagnosis: Altered mental status  Specific instructions for Next Treatment: instruct on coordination exercise and advance to a flight of steps  History: Altered mental status  PT Education: Home Exercise Program  Barriers to Learning: none  REQUIRES PT FOLLOW UP: Yes  Activity Tolerance  Activity Tolerance: Patient Tolerated treatment well     Patient Diagnosis(es): The primary encounter diagnosis was Altered mental status, unspecified altered mental status type. Diagnoses of Basal ganglia stroke ([facility] [address] and Dysphagia, unspecified type were also pertinent to this visit. has no past medical history on file. has a past surgical history that includes Tonsillectomy. Restrictions  Restrictions/Precautions  Restrictions/Precautions: General Precautions, Fall Risk, Up as Tolerated (Peripheral IV R anticube)  Required Braces or Orthoses?: No  Implants present? :  (denies)  Position Activity Restriction  Other position/activity restrictions: Up as tolerated,      Subjective   General  Chart Reviewed: Yes  Additional Pertinent Hx: Alter mental status  Response To Previous Treatment: Patient with no complaints from previous session. Family / Caregiver Present:  ( and his brother)  Referring Practitioner: Cindy Blankenship MD  Subjective  Subjective: Patient in bed upon arrival. Pt is pleasant and cooperative for therapy. General Comment  Comments: NETTA evangelista approves pt for PT.   Pain Screening  Patient Currently in Pain: Denies  Vital Signs  Patient Currently in Pain: Denies Orientation  Orientation  Overall Orientation Status: Within Functional Limits  Cognition      Objective   Bed mobility  Supine to Sit: Modified independent  Sit to Supine: Unable to assess (pt left up in chair)  Scooting: Independent  Transfers  Sit to Stand: Independent  Stand to sit: Independent  Bed to Chair: Independent  Comment: no device used. Ambulation  Ambulation?: Yes  WB Status: full WB  Ambulation 1  Surface: level tile  Device: No Device  Assistance: Supervision  Gait Deviations: None  Distance: 300 ft+  Stairs/Curb  Stairs?: Yes  Stairs  # Steps : 18 (9 steps with L HR, 9 steps with R LE.)  Stairs Height: 6\"  Rails: Right ascending;Left ascending  Assistance: Supervision  Comment: cammie good technique, no difficulty     Balance  Posture: Good  Sitting - Static: Good  Sitting - Dynamic: Good  Standing - Static: Good (no device)  Standing - Dynamic: Good (no device)  Other exercises  Other exercises?: Yes  Other exercises 1: seated B LE exercises x10 reps  Other exercises 2: HEP provided to patient including supine, seated, and standing exercises. All questions of patient and patient's  answered at this time.      Goals  Short term goals  Time Frame for Short term goals: daily 3 days  Short term goal 1: pt to demonstrate ability to perform sit <=> stand transfers independently  Short term goal 2: pt to demonstrate ability to ascend and descend 12 steps with supervision  Short term goal 3: pt to demonstrate ability to walk 150ft independently  Short term goal 4: pt to demonstrate ability to perform HEP with good technique  Patient Goals   Patient goals : daily 3 days    Plan    Plan  Times per week: daily 3 times  Times per day:  (daily 3 times)  Specific instructions for Next Treatment: instruct on coordination exercise and advance to a flight of steps  Current Treatment Recommendations: Strengthening, Neuromuscular Re-education, Home Exercise Program, Balance Training, Safety Education & Training, Endurance Training, Patient/Caregiver Education & Training, Functional Mobility Training, Transfer Training, Gait Training, Stair training  Safety Devices  Type of devices: Gait belt, Nurse notified, Left in chair, Call light within reach  Restraints  Initially in place: No     Therapy Time   Individual Concurrent Group Co-treatment   Time In 1211         Time Out 1234         Minutes [address] Bhakta, PTA

## 2021-10-29 NOTE — PROGRESS NOTES
Speech Language Pathology  Speech Language Pathology  [facility]    Dysphagia Treatment Note    Date: 10/29/2021  Patients Name: Ashley Hollis  MRN: [id number]  Diagnosis: CVA  Patient Active Problem List   Diagnosis Code    Altered mental status R41.82    AMS (altered mental status) R41.82    Fatigue R53.83    Word finding difficulty R47.89    Mass of upper lobe of right lung R91.8    Cerebrovascular accident (CVA) due to bilateral occlusion of middle cerebral arteries ([facility] [address] I63.513       Pain: pt. denies    Dysphagia Treatment  Treatment time: 3789-7079    Subjective: [x] Alert [x] Cooperative     [] Confused     [] Agitated    [] Lethargic    Objective/Assessment:    Per LÓPEZ working c pt. This am, patient and family wanted to speak to ST re: MBS results from yesterday and further concerns of pt. Choking. Pt, Pt.  and brother report that pt. Demonstrated no choking or difficulty swallowing last night at dinner, this am at breakfast or this pm at lunch. They recalled that MBS results from yesterday revealed a functional swallow but they are concerned that pt. May choke again. ST educ. That no postural compensations or techniques are able to be recommended as pt. Demonstrated functional swallow during swallow study. ST did educ. Pt. And family re: recommendations of sitting upright at 90 degrees, small bites and sips and slow rate of eating/drinking. Patient and family inquiring re: techniques for word finding difficulty. ST recommends further f/u for expressive language following d/c as an outpatient. ST educ. Pt. Re: compensatory word finding strategy of cicumlocution. Pt. Verbalized understanding. ST to provide further exercises as able this pm, however, pt. Is to be d/c from hospital potentially this pm.           Plan:  [x] Continue ST services    [] Discharge from ST:        Discharge recommendations: []  Further therapy recommended at discharge. The patient should be able to tolerate at least 3 hours of therapy per day over 5 days or 15 hours over 7 days. [x] Further therapy recommended at discharge. Outpatient ST for expressive aphasia  [] No therapy recommended at discharge. Treatment completed by: Thomas Crouch A.CCC/SLP

## 2021-10-29 NOTE — PLAN OF CARE
Problem: HEMODYNAMIC STATUS  Goal: Patient has stable vital signs and fluid balance  Outcome: Ongoing     Problem: ACTIVITY INTOLERANCE/IMPAIRED MOBILITY  Goal: Mobility/activity is maintained at optimum level for patient  Outcome: Ongoing     Problem: COMMUNICATION IMPAIRMENT  Goal: Ability to express needs and understand communication  Outcome: Ongoing     Problem: Neurological  Goal: Maximum potential motor/sensory/cognitive function  Outcome: Ongoing

## 2021-10-29 NOTE — DISCHARGE INSTR - ACTIVITY
Up as tolerated using safety factors. Bleeding precautions:   brush teeth gently to avoid bleeding gums  Take care around sharp objects such as in the kitchen. Watch for pink water that would indicate blood in your stool or urine. Report this to your doctor. Clear your thraot gently to avoid throat irritation. Iraj Client your nose gently to avoid nose bleed.

## 2021-10-29 NOTE — PLAN OF CARE
Problem: HEMODYNAMIC STATUS  Goal: Patient has stable vital signs and fluid balance  Outcome: Completed     Problem: ACTIVITY INTOLERANCE/IMPAIRED MOBILITY  Goal: Mobility/activity is maintained at optimum level for patient  Outcome: Completed     Problem: COMMUNICATION IMPAIRMENT  Goal: Ability to express needs and understand communication  Outcome: Completed     Problem: Neurological  Goal: Maximum potential motor/sensory/cognitive function  Outcome: Completed

## 2021-10-29 NOTE — DISCHARGE INSTR - DIET

## 2021-10-29 NOTE — PROGRESS NOTES
Patient given paper RX for discharge medications.  Electronically signed by Charmaine Salgado RN on 10/29/2021 at 3:31 PM

## 2021-10-29 NOTE — ADT AUTH CERT
Stroke: Ischemic - Care Day 2 (10/28/2021) by Sharan Godinez RN       Review Status Review Entered   Completed 10/29/2021 10:49      Criteria Review      Care Day: 2 Care Date: 10/28/2021 Level of Care: Intermediate Care    Guideline Day 2    Clinical Status    ( ) * Hemodynamic stability    10/29/2021 10:49 AM EDT by Latrice Dejesus      98.2 (36.8)  16  68  150/73    (X) * Mental status at baseline or stable    10/29/2021 10:49 AM EDT by Latrice Dejesus      ox3    ( ) * Neurologic deficits absent or stable    10/29/2021 10:49 AM EDT by Latrice Dejesus      Speech slow at times    (X) * Unimpaired swallowing    10/29/2021 10:49 AM EDT by Latrice Dejesus      regular diet    Activity    ( ) * Up to chair    (X) Possible assisted ambulation    10/29/2021 10:49 AM EDT by Latrice Dejesus      PT/OT up with assist    Routes    ( ) * Oral hydration    (X) * Oral medications    10/29/2021 10:49 AM EDT by Latrice Dejesus      PO Lipitor 40mg hs--PO Cipro 500mg q 12h--    ( ) * Advance diet as tolerated    Interventions    (X) Neurologic checks    10/29/2021 10:49 AM EDT by Latrice Dejesus      q4h    (X) Possible physical therapy    10/29/2021 10:49 AM EDT by Latrice Dejesus      yes    (X) Possible occupational and speech therapy    10/29/2021 10:49 AM EDT by Latrice Dejesus      ot    * Milestone   Additional Notes   10/28      IV NS at 75ml/h         SLP--MBS      Onset of problem:     Per IM physician H&P:The patient is R 82 y.o.  female, with no significant medical history.  Patient does not take any prescribed medications.  Patient did start taking a herbal supplement recently but cannot recall its name. Jemima Recio brought to the ER by her  for evaluation of fatigue, confusion, difficulty finding words and withdrawn. Blanco Johnson states that he noticed patient symptoms 4 days ago.  Patient denies headache, visual disturbance, nausea, abdominal pain, chest pain, cough, shortness of breath, dysuria, fever or chills. Pablo Seals      Diagnosis: Pt. demonstrated functional comprehension and cognition.  Mild expressive aphasia noted (nonfluent) during conversation and complex expression tasks. Treatment for expressive language is warranted to bring function to premorbid level. Recommendations:   Requires SLP Intervention: Yes   D/C Recommendations: Outpatient      _______________________________      PT      Ambulation 1   Surface: level tile   Device: No Device   Assistance: Stand by assistance   Gait Deviations: None   Distance: 120ft x2   Stairs/Curb   Stairs?: Yes   Stairs   # Steps : 5   Stairs Height: 8\"   Rails: Right ascending   Assistance: Stand by assistance   Balance   Posture: Good   Sitting - Static: Good   Sitting - Dynamic: Good   Standing - Static: Good (no device)   Standing - Dynamic: Good (no device)   ___________________________      K 3.6      MRI Brain   Impression       Brain MRI:       Round area of restricted diffusion involving the left corona radiata   extending into the left basal ganglia suggestive of acute to subacute   infarction.       Old lacunar infarctions of right corona radiata and left basal ganglia.       There is no acute intracranial hemorrhage, or intracranial mass lesion.       Mild chronic microangiopathic ischemic disease.       Thoracic spine MRI:       There is T2 hyperintense extradural extra medullary enhancing mass lesion   predominantly centered within the right extraforaminal location at the level   T2-T3 measuring approximately 4.3 x 3.9 by 4.1 cm (AP x TV x SI).  The mass   lesion extends into the right neural foramen with remodeling of right neural   foramen and also mildly extends into the spinal canal.  No significant mass   effect on thecal sac.  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            ____________________               Stroke: Ischemic - Care Day 1 (10/27/2021) by Shon Correia RN       Review Status Review Entered   Completed 10/29/2021 10:34      Criteria Review      Care Day: 1 Care Date: 10/27/2021 Level of Care: Intermediate Care    Guideline Day 1    Clinical Status    (X) * Clinical Indications met    10/29/2021 10:34 AM EDT by Barbara German      evaluation of fatigue, confusion, difficulty finding words and withdrawn--MRI on 10/28:evaluation of fatigue, confusion, difficulty finding words and withdrawn    Activity    ( ) Bed rest    10/29/2021 10:34 AM EDT by Shante william as mariza    Interventions    (X) Neurologic checks    10/29/2021 10:34 AM EDT by Barbara German      q4h    (X) Check lipid levels    10/29/2021 10:34 AM EDT by Barbara German      on 10/28    (X) Neurology consultation    10/29/2021 10:34 AM EDT by Barbara German      yes    (X) Laboratory studies    10/29/2021 10:34 AM EDT by Barbara German      CBC  BMP    (X) Cardiac monitoring    10/29/2021 10:34 AM EDT by Barbara German      cont    (X) Imaging studies (ie, brain, intracranial vascular)    10/29/2021 10:34 AM EDT by Barbara German      CT MRI    (X) Swallowing evaluation    10/29/2021 10:34 AM EDT by Fred Key to diet order    * Milestone   Additional Notes   10/27      Internal Med          PHYSICAL EXAM       BP (!) 155/77   Pulse 72   Temp 98.7 °F (37.1 °C) (Oral)   Resp 18   Ht 5' 5\" (1.651 m)   Wt 130 lb (59 kg)   SpO2 96%   BMI 21.63 kg/m²  Body mass index is 21.63 kg/m².         Physical Exam   Constitutional:        General: She is not in acute distress.      Appearance: Normal appearance. She is well-developed, well-groomed and normal weight.     HENT:       Head: Normocephalic and atraumatic.       Right Ear: External ear normal.       Left Ear: External ear normal.       Nose: Nose normal.       Mouth/Throat:       Mouth: Mucous membranes are moist.    Eyes:       General: Lids are normal. Vision grossly intact.       Extraocular Movements: Extraocular movements intact.       Right eye: No nystagmus.       Left eye: No nystagmus.       Conjunctiva/sclera: Conjunctivae normal.       Pupils: Pupils are equal, round, and reactive to light. Cardiovascular:       Rate and Rhythm: Normal rate and regular rhythm.       Pulses: Normal pulses.       Heart sounds: Normal heart sounds. Pulmonary:       Effort: Pulmonary effort is normal.       Breath sounds: Normal breath sounds. No decreased breath sounds, wheezing, rhonchi or rales. Abdominal:       General: Bowel sounds are normal.       Palpations: Abdomen is soft.       Tenderness: There is no abdominal tenderness. Musculoskeletal:          General: Normal range of motion.       Cervical back: Full passive range of motion without pain, normal range of motion and neck supple.       Right lower leg: No edema.       Left lower leg: No edema. Skin:      General: Skin is warm and dry.       Capillary Refill: Capillary refill takes less than 2 seconds. Neurological:       Mental Status: She is alert and oriented to person, place, and time.       GCS: GCS eye subscore is 4. GCS verbal subscore is 5. GCS motor subscore is 6.       Cranial Nerves: Dysarthria present. No facial asymmetry.       Sensory: Sensation is intact.       Motor: Motor function is intact.       Coordination: Coordination is intact.       Comments: Patient is alert oriented x3.  Patient is able to answer questions but is slow to answer at times.  No sensory deficit.  Patient exhibits 5 out of 5 equal strength in upper and lower extremities.  Coordination intact. Psychiatric:          Attention and Perception: Attention normal.          Mood and Affect: Affect is flat.          Speech: Speech is delayed.          Behavior: Behavior is withdrawn. Behavior is cooperative.          Thought Content:  Thought content normal.          Cognition and Memory: Cognition normal.          Judgment: Judgment normal.    \         ASSESSMENT  and  PLAN       Principal Problem:     AMS (altered mental status)   Active Problems:     Altered mental status     Fatigue     Word finding difficulty     Mass of upper lobe of right lung   Resolved Problems:     * No resolved hospital problems. *       Plan:       AMS/fatigue/dysarthria/right upper lung mass   -CT head without contrast shows No evidence of acute intracranial process.  Mild chronic small vessel ischemic changes noted, along with several punctate remote bilateral basal ganglia region lacunar infarcts and a larger infarct within the anterior left basal ganglia region which may be subacute or chronic.    -CTA head neck shows no large vessel occlusion or hemodynamic stenosis.  Post mediastinal mass right upper lung, thought to be could represent a neurofibroma versus schwannoma   -EKG shows normal sinus rhythm   -Awaiting UA   -Neurology consult   -ER physician spoke with neurologist.   -Patient given aspirin 324 mg in the ED   -Aspirin 81 mg daily   -Lipitor 40 mg nightly   -MRI brain in the morning       DVT prophylaxis-Lovenox 40 mg daily         Stroke: Ischemic - Clinical Indications for Admission to Inpatient Care by Becka Mccartney RN       Review Status Review Entered   Completed 10/29/2021 10:27      Criteria Review      Clinical Indications for Admission to Inpatient Care    Most Recent : Daysi Rudd Most Recent Date: 10/29/2021 10:27 AM EDT    (X) Admission is indicated for  1 or more  of the following  [A] [B] (1) (3) (4) (5) (6):       (X) Acute ischemic stroke [A] with neurologic findings that warrant inpatient care, as indicated       by  1 or more  of the following :          (X) Altered mental status          10/29/2021 10:27 AM EDT by Daysi Rudd            evaluation of fatigue, confusion, difficulty finding words and withdrawn   Additional Notes   10/27      To ER             Chief Complaint   Patient presents with   · Altered Mental Status       Tirso Herr is a 59 y.o. female who presents with altered mental status.  Symptoms started Sunday.  Patient's  is with her today. Carrie Cespedes reports issues with finding words and forming sentences, as well as increased fatigue.  His  also notes that she appears withdrawn and is not her normal, talkative self. (!) 154/60 98.8 °F (37.1 °C) Oral 77 16 97 %        Cranial Nerves: Dysarthria present         Pro- (*)         CT   No evidence of acute intracranial process.  Mild chronic small vessel ischemic changes noted, along with several punctate remote bilateral basal ganglia region lacunar infarcts and a larger infarct within the anterior left basal ganglia region which may be subacute or chronic. CT      Negative for large vessel occlusion or hemodynamic stenosis. Post mediastinal mass right upper lung, thought to be could represent a neurofibroma versus schwannoma. .  This could be further evaluated with MRI thoracic spine or with and without without contrast for further characterization.                  FINAL IMPRESSION        1. Altered mental status, unspecified altered mental status type    2. Basal ganglia stroke ([facility] [address]    3.  Dysphagia, unspecified type               ER Meds   PO ASA 324mg x1   IV NS 80ml

## 2021-10-31 ENCOUNTER — HOSPITAL ENCOUNTER (INPATIENT)
Age: 61
LOS: 1 days | Discharge: HOME | DRG: 066 | End: 2021-11-01
Attending: STUDENT IN AN ORGANIZED HEALTH CARE EDUCATION/TRAINING PROGRAM | Admitting: INTERNAL MEDICINE
Payer: COMMERCIAL

## 2021-10-31 ENCOUNTER — APPOINTMENT (OUTPATIENT)
Dept: CT IMAGING | Age: 61
DRG: 066 | End: 2021-10-31
Payer: COMMERCIAL

## 2021-10-31 DIAGNOSIS — R11.2 NAUSEA AND VOMITING, INTRACTABILITY OF VOMITING NOT SPECIFIED, UNSPECIFIED VOMITING TYPE: Primary | ICD-10-CM

## 2021-10-31 DIAGNOSIS — E86.0 DEHYDRATION: ICD-10-CM

## 2021-10-31 DIAGNOSIS — Z86.73 HISTORY OF RECENT STROKE: ICD-10-CM

## 2021-10-31 LAB
-: ABNORMAL
ABSOLUTE EOS #: 0 K/UL (ref 0–0.4)
ABSOLUTE IMMATURE GRANULOCYTE: ABNORMAL K/UL (ref 0–0.3)
ABSOLUTE LYMPH #: 0.7 K/UL (ref 1–4.8)
ABSOLUTE MONO #: 0.7 K/UL (ref 0.1–1.3)
ALBUMIN SERPL-MCNC: 4.1 G/DL (ref 3.5–5.2)
ALBUMIN/GLOBULIN RATIO: ABNORMAL (ref 1–2.5)
ALP BLD-CCNC: 57 U/L (ref 35–104)
ALT SERPL-CCNC: 13 U/L (ref 5–33)
AMORPHOUS: ABNORMAL
ANION GAP SERPL CALCULATED.3IONS-SCNC: 9 MMOL/L (ref 9–17)
AST SERPL-CCNC: 14 U/L
BACTERIA: ABNORMAL
BASOPHILS # BLD: 1 % (ref 0–2)
BASOPHILS ABSOLUTE: 0.1 K/UL (ref 0–0.2)
BILIRUB SERPL-MCNC: 1 MG/DL (ref 0.3–1.2)
BILIRUBIN URINE: NEGATIVE
BUN BLDV-MCNC: 24 MG/DL (ref 8–23)
BUN/CREAT BLD: ABNORMAL (ref 9–20)
CALCIUM SERPL-MCNC: 9.2 MG/DL (ref 8.6–10.4)
CASTS UA: ABNORMAL /LPF
CHLORIDE BLD-SCNC: 103 MMOL/L (ref 98–107)
CO2: 28 MMOL/L (ref 20–31)
COLOR: YELLOW
COMMENT UA: ABNORMAL
CREAT SERPL-MCNC: 0.99 MG/DL (ref 0.5–0.9)
CRYSTALS, UA: ABNORMAL /HPF
DIFFERENTIAL TYPE: ABNORMAL
EOSINOPHILS RELATIVE PERCENT: 0 % (ref 0–4)
EPITHELIAL CELLS UA: ABNORMAL /HPF
GFR AFRICAN AMERICAN: >60 ML/MIN
GFR NON-AFRICAN AMERICAN: 57 ML/MIN
GFR NON-AFRICAN AMERICAN: >60 ML/MIN
GFR SERPL CREATININE-BSD FRML MDRD: >60 ML/MIN
GFR SERPL CREATININE-BSD FRML MDRD: ABNORMAL ML/MIN/{1.73_M2}
GFR SERPL CREATININE-BSD FRML MDRD: ABNORMAL ML/MIN/{1.73_M2}
GFR SERPL CREATININE-BSD FRML MDRD: NORMAL ML/MIN/{1.73_M2}
GLUCOSE BLD-MCNC: 160 MG/DL (ref 70–99)
GLUCOSE URINE: NEGATIVE
HCT VFR BLD CALC: 40.5 % (ref 36–46)
HEMOGLOBIN: 13.5 G/DL (ref 12–16)
IMMATURE GRANULOCYTES: ABNORMAL %
INR BLD: 1
KETONES, URINE: NEGATIVE
LEUKOCYTE ESTERASE, URINE: NEGATIVE
LYMPHOCYTES # BLD: 5 % (ref 24–44)
MCH RBC QN AUTO: 31.1 PG (ref 26–34)
MCHC RBC AUTO-ENTMCNC: 33.3 G/DL (ref 31–37)
MCV RBC AUTO: 93.3 FL (ref 80–100)
MONOCYTES # BLD: 5 % (ref 1–7)
MUCUS: ABNORMAL
NITRITE, URINE: NEGATIVE
NRBC AUTOMATED: ABNORMAL PER 100 WBC
OTHER OBSERVATIONS UA: ABNORMAL
PARTIAL THROMBOPLASTIN TIME: 25.1 SEC (ref 24–36)
PDW BLD-RTO: 13.1 % (ref 11.5–14.9)
PH UA: 6 (ref 5–8)
PLATELET # BLD: 179 K/UL (ref 150–450)
PLATELET ESTIMATE: ABNORMAL
PMV BLD AUTO: 9.2 FL (ref 6–12)
POC CREATININE: 0.81 MG/DL (ref 0.51–1.19)
POTASSIUM SERPL-SCNC: 4.1 MMOL/L (ref 3.7–5.3)
PROTEIN UA: NEGATIVE
PROTHROMBIN TIME: 13 SEC (ref 11.8–14.6)
RBC # BLD: 4.34 M/UL (ref 4–5.2)
RBC # BLD: ABNORMAL 10*6/UL
RBC UA: ABNORMAL /HPF
RENAL EPITHELIAL, UA: ABNORMAL /HPF
SEG NEUTROPHILS: 89 % (ref 36–66)
SEGMENTED NEUTROPHILS ABSOLUTE COUNT: 11.9 K/UL (ref 1.3–9.1)
SODIUM BLD-SCNC: 140 MMOL/L (ref 135–144)
SPECIFIC GRAVITY UA: 1.06 (ref 1–1.03)
TOTAL PROTEIN: 6 G/DL (ref 6.4–8.3)
TRICHOMONAS: ABNORMAL
TURBIDITY: CLEAR
URINE HGB: ABNORMAL
UROBILINOGEN, URINE: NORMAL
WBC # BLD: 13.4 K/UL (ref 3.5–11)
WBC # BLD: ABNORMAL 10*3/UL
WBC UA: ABNORMAL /HPF
YEAST: ABNORMAL

## 2021-10-31 PROCEDURE — 2580000003 HC RX 258: Performed by: INTERNAL MEDICINE

## 2021-10-31 PROCEDURE — 70496 CT ANGIOGRAPHY HEAD: CPT

## 2021-10-31 PROCEDURE — 6360000004 HC RX CONTRAST MEDICATION: Performed by: INTERNAL MEDICINE

## 2021-10-31 PROCEDURE — 2580000003 HC RX 258: Performed by: STUDENT IN AN ORGANIZED HEALTH CARE EDUCATION/TRAINING PROGRAM

## 2021-10-31 PROCEDURE — 6370000000 HC RX 637 (ALT 250 FOR IP): Performed by: INTERNAL MEDICINE

## 2021-10-31 PROCEDURE — 6360000004 HC RX CONTRAST MEDICATION: Performed by: STUDENT IN AN ORGANIZED HEALTH CARE EDUCATION/TRAINING PROGRAM

## 2021-10-31 PROCEDURE — 80053 COMPREHEN METABOLIC PANEL: CPT

## 2021-10-31 PROCEDURE — 93005 ELECTROCARDIOGRAM TRACING: CPT | Performed by: STUDENT IN AN ORGANIZED HEALTH CARE EDUCATION/TRAINING PROGRAM

## 2021-10-31 PROCEDURE — 99285 EMERGENCY DEPT VISIT HI MDM: CPT

## 2021-10-31 PROCEDURE — 36415 COLL VENOUS BLD VENIPUNCTURE: CPT

## 2021-10-31 PROCEDURE — 2060000000 HC ICU INTERMEDIATE R&B

## 2021-10-31 PROCEDURE — 6360000002 HC RX W HCPCS: Performed by: INTERNAL MEDICINE

## 2021-10-31 PROCEDURE — 85610 PROTHROMBIN TIME: CPT

## 2021-10-31 PROCEDURE — 99448 NTRPROF PH1/NTRNET/EHR 21-30: CPT | Performed by: PSYCHIATRY & NEUROLOGY

## 2021-10-31 PROCEDURE — 81001 URINALYSIS AUTO W/SCOPE: CPT

## 2021-10-31 PROCEDURE — 85025 COMPLETE CBC W/AUTO DIFF WBC: CPT

## 2021-10-31 PROCEDURE — 85730 THROMBOPLASTIN TIME PARTIAL: CPT

## 2021-10-31 PROCEDURE — 82565 ASSAY OF CREATININE: CPT

## 2021-10-31 PROCEDURE — 70450 CT HEAD/BRAIN W/O DYE: CPT

## 2021-10-31 PROCEDURE — 99223 1ST HOSP IP/OBS HIGH 75: CPT | Performed by: INTERNAL MEDICINE

## 2021-10-31 PROCEDURE — 74177 CT ABD & PELVIS W/CONTRAST: CPT

## 2021-10-31 RX ORDER — ONDANSETRON 2 MG/ML
4 INJECTION INTRAMUSCULAR; INTRAVENOUS EVERY 6 HOURS PRN
Status: DISCONTINUED | OUTPATIENT
Start: 2021-10-31 | End: 2021-11-01 | Stop reason: HOSPADM

## 2021-10-31 RX ORDER — SODIUM CHLORIDE 0.9 % (FLUSH) 0.9 %
5-40 SYRINGE (ML) INJECTION EVERY 12 HOURS SCHEDULED
Status: DISCONTINUED | OUTPATIENT
Start: 2021-10-31 | End: 2021-11-01 | Stop reason: HOSPADM

## 2021-10-31 RX ORDER — SODIUM CHLORIDE 0.9 % (FLUSH) 0.9 %
5-40 SYRINGE (ML) INJECTION PRN
Status: DISCONTINUED | OUTPATIENT
Start: 2021-10-31 | End: 2021-11-01 | Stop reason: HOSPADM

## 2021-10-31 RX ORDER — 0.9 % SODIUM CHLORIDE 0.9 %
80 INTRAVENOUS SOLUTION INTRAVENOUS ONCE
Status: COMPLETED | OUTPATIENT
Start: 2021-10-31 | End: 2021-10-31

## 2021-10-31 RX ORDER — ATORVASTATIN CALCIUM 40 MG/1
40 TABLET, FILM COATED ORAL NIGHTLY
Status: DISCONTINUED | OUTPATIENT
Start: 2021-10-31 | End: 2021-11-01 | Stop reason: HOSPADM

## 2021-10-31 RX ORDER — ONDANSETRON 4 MG/1
4 TABLET, ORALLY DISINTEGRATING ORAL EVERY 8 HOURS PRN
Status: DISCONTINUED | OUTPATIENT
Start: 2021-10-31 | End: 2021-11-01 | Stop reason: HOSPADM

## 2021-10-31 RX ORDER — HYDRALAZINE HYDROCHLORIDE 20 MG/ML
10 INJECTION INTRAMUSCULAR; INTRAVENOUS EVERY 6 HOURS PRN
Status: DISCONTINUED | OUTPATIENT
Start: 2021-10-31 | End: 2021-11-01 | Stop reason: HOSPADM

## 2021-10-31 RX ORDER — SODIUM CHLORIDE 9 MG/ML
25 INJECTION, SOLUTION INTRAVENOUS PRN
Status: DISCONTINUED | OUTPATIENT
Start: 2021-10-31 | End: 2021-11-01 | Stop reason: HOSPADM

## 2021-10-31 RX ORDER — SODIUM CHLORIDE 9 MG/ML
INJECTION, SOLUTION INTRAVENOUS CONTINUOUS
Status: DISCONTINUED | OUTPATIENT
Start: 2021-10-31 | End: 2021-11-01 | Stop reason: HOSPADM

## 2021-10-31 RX ORDER — SODIUM CHLORIDE 0.9 % (FLUSH) 0.9 %
10 SYRINGE (ML) INJECTION PRN
Status: DISCONTINUED | OUTPATIENT
Start: 2021-10-31 | End: 2021-11-01 | Stop reason: HOSPADM

## 2021-10-31 RX ORDER — 0.9 % SODIUM CHLORIDE 0.9 %
1000 INTRAVENOUS SOLUTION INTRAVENOUS ONCE
Status: COMPLETED | OUTPATIENT
Start: 2021-10-31 | End: 2021-10-31

## 2021-10-31 RX ORDER — ACETAMINOPHEN 325 MG/1
650 TABLET ORAL EVERY 4 HOURS PRN
Status: DISCONTINUED | OUTPATIENT
Start: 2021-10-31 | End: 2021-11-01 | Stop reason: HOSPADM

## 2021-10-31 RX ORDER — ASPIRIN 81 MG/1
81 TABLET ORAL DAILY
Status: DISCONTINUED | OUTPATIENT
Start: 2021-10-31 | End: 2021-11-01 | Stop reason: HOSPADM

## 2021-10-31 RX ADMIN — SODIUM CHLORIDE 80 ML: 9 INJECTION, SOLUTION INTRAVENOUS at 18:09

## 2021-10-31 RX ADMIN — IOPAMIDOL 75 ML: 755 INJECTION, SOLUTION INTRAVENOUS at 18:08

## 2021-10-31 RX ADMIN — SODIUM CHLORIDE 80 ML: 9 INJECTION, SOLUTION INTRAVENOUS at 05:17

## 2021-10-31 RX ADMIN — ASPIRIN 81 MG: 81 TABLET, COATED ORAL at 18:12

## 2021-10-31 RX ADMIN — SODIUM CHLORIDE: 9 INJECTION, SOLUTION INTRAVENOUS at 18:12

## 2021-10-31 RX ADMIN — SODIUM CHLORIDE 1000 ML: 9 INJECTION, SOLUTION INTRAVENOUS at 07:28

## 2021-10-31 RX ADMIN — IOPAMIDOL 75 ML: 755 INJECTION, SOLUTION INTRAVENOUS at 05:16

## 2021-10-31 RX ADMIN — ONDANSETRON 4 MG: 2 INJECTION INTRAMUSCULAR; INTRAVENOUS at 22:52

## 2021-10-31 RX ADMIN — SODIUM CHLORIDE, PRESERVATIVE FREE 10 ML: 5 INJECTION INTRAVENOUS at 05:17

## 2021-10-31 RX ADMIN — SODIUM CHLORIDE, PRESERVATIVE FREE 10 ML: 5 INJECTION INTRAVENOUS at 18:09

## 2021-10-31 ASSESSMENT — ENCOUNTER SYMPTOMS
ABDOMINAL PAIN: 0
NAUSEA: 0
PHOTOPHOBIA: 0
VOMITING: 0
EYE ITCHING: 0
COUGH: 0
DIARRHEA: 0
COLOR CHANGE: 0
FACIAL SWELLING: 0
RHINORRHEA: 0
SHORTNESS OF BREATH: 0

## 2021-10-31 ASSESSMENT — PAIN SCALES - GENERAL: PAINLEVEL_OUTOF10: 0

## 2021-10-31 NOTE — ED NOTES
Report given to [facility], RN from . Report method by phone   The following was reviewed with receiving RN:   Current vital signs:  /79   Pulse 78   Temp 99.1 °F (37.3 °C) (Oral)   Resp 16   Wt 130 lb (59 kg)   SpO2 97%   BMI 21.63 kg/m²                MEWS Score: 1     Any medication or safety alerts were reviewed. Any pending diagnostics and notifications were also reviewed, as well as any safety concerns or issues, abnormal labs, abnormal imaging, and abnormal assessment findings. Questions were answered.             Misty Brown RN  10/31/21 [id number]

## 2021-10-31 NOTE — CONSULTS
I was called about this patient who is well known to me. She suffered a rather large left subcortical stroke and had some mental status changes that were perhaps in part due to a UTI. I was informed that she had a syncopal event and at one point a systolic BP in the 06C. Clearly hypotension and perhaps dehydration are the main issues. It is doubtful that she suffered a new stroke but the type of stroke she had is quite notorious for having fluctuations. There is some scant hemorrhagic conversion in the prior stroke    Patient will be seen tomorrow by consult service. Recommend resumption of dual antiplatelet therapy and aggressive hydration but it is reasonable to hold Plavix but I am not particularly concerned for the hemorrhage worsening. No need for new neuroimaging at this time. There is no need for an EEG, this was clearly syncope due to hypotension      [place]. Hart Leventhal M.D.   Clinical Neurophysiologist  Neuromuscular Medicine

## 2021-10-31 NOTE — VIRTUAL HEALTH
Consults  Patient Location:  [address] [place] [address] ED    Provider Location (City/State): [address] [place] [address] Stroke and Vascular Neurology Consult for  [employer] Stroke Alert through [address] @   10/31/2021 8:49 AM  Pt Name: Musa An  MRN: [id number]  YOB: 1960  Date of evaluation: 10/31/2021  Primary Care Physician: No primary care provider on file. Reason for Evaluation: Stroke evaluation with Phone Consult, Discussion and Review of imaging    Musa An is a 59 y.o. female with recent past medical history of left coronary radiator acute infarct on dual antiplatelet presented with nausea, vomiting, fatigue and worsening right-sided weakness, speech difficulty. NIH stroke scale was 2. LKW: 0200 am   NIH:  2    Allergies  is allergic to pcn [penicillins] and suprax [cefixime]. Medications  Prior to Admission medications    Medication Sig Start Date End Date Taking? Authorizing Provider   aspirin 81 MG EC tablet Take 1 tablet by mouth daily 10/30/21   Shelby Bernard MD   ciprofloxacin (CIPRO) 500 MG tablet Take 1 tablet by mouth every 12 hours for 4 doses 10/29/21 10/31/21  Shelby Bernard MD   atorvastatin (LIPITOR) 40 MG tablet Take 1 tablet by mouth nightly 10/29/21   Shelby Bernard MD   lisinopril (PRINIVIL;ZESTRIL) 10 MG tablet Take 1 tablet by mouth daily 10/30/21   Shelby Bernard MD   clopidogrel (PLAVIX) 75 MG tablet Take 1 tablet by mouth daily 10/30/21   Shelby Bernard MD    Scheduled Meds:  Continuous Infusions:  PRN Meds:.sodium chloride flush  Past Medical History   has a past medical history of CVA (cerebral vascular accident) ([facility] [address]   Social History  Social History     Socioeconomic History    Marital status:      Spouse name: Not on file    Number of children: Not on file    Years of education: Not on file    Highest education level: Not on file   Occupational History    Not on file   Tobacco Use    Smoking status: Never Smoker    Smokeless tobacco: Never Used   Substance and Sexual Activity    Alcohol use: Not on file    Drug use: Never    Sexual activity: Not on file   Other Topics Concern    Not on file   Social History Narrative    Not on file     Social Determinants of Health     Financial Resource Strain:     Difficulty of Paying Living Expenses:    Food Insecurity:     Worried About Running Out of Food in the Last Year:     [address] in the Last Year:    Transportation Needs:     Lack of Transportation (Medical):  Lack of Transportation (Non-Medical):    Physical Activity:     Days of Exercise per Week:     Minutes of Exercise per Session:    Stress:     Feeling of Stress :    Social Connections:     Frequency of Communication with Friends and Family:     Frequency of Social Gatherings with Friends and Family:     Attends [employer] Services:     Active Member of Clubs or Organizations:     Attends Club or Organization Meetings:     Marital Status:    Intimate Partner Violence:     Fear of Current or Ex-Partner:     Emotionally Abused:     Physically Abused:     Sexually Abused:      Family History  History reviewed. No pertinent family history. OBJECTIVE  BP (!) 114/54   Pulse 79   Temp 99.1 °F (37.3 °C) (Oral)   Resp 15   Wt 130 lb (59 kg)   SpO2 95%   BMI 21.63 kg/m²     NIH Stroke Scale  Interval: Baseline  Level of Consciousness (1a. ): Alert  LOC Questions (1b. ):  Answers both correctly  LOC Commands (1c. ): Performs both tasks correctly  Best Gaze (2. ): Normal  Visual (3. ): No visual loss  Facial Palsy (4. ): (!) Minor paralysis  Motor Arm, Left (5a. ): No drift  Motor Arm, Right (5b. ): No drift  Motor Leg, Left (6a. ): No drift  Motor Leg, Right (6b. ): No drift  Limb Ataxia (7. ): Absent  Sensory (8. ): Normal  Best Language (9. ): Mild to moderate aphasia  Dysarthria (10. ): Normal  Extinction and Inattention (11): No abnormality  Total: 2  Pre-Morbid mRS: 03    Imaging:  Images were personally reviewed with ABILIO HOLMAN used to review images including:  CT brain without contrast: Left corona radiata subacute infarct with small hemorrhagic conversion compared to prior CT head   CTA imaging: No evidence of intracranial LVO, no evidence of hemodynamically significant stenosis. Assessment  Misbah Madrigal is a 59 y.o. female with recent past medical history of left coronary radiata acute infarct on dual antiplatelet presented with nausea, vomiting, fatigue and worsening right-sided weakness, speech difficulty. NIH 02. Differential DDx:  Recrudescence of prior ischemic stroke      Recommendations:  1. Hospital admission for medical management. 2. C/w single antiplatelet and statin   3. SBP goal 100-140 mm Hg       Discussed with ED Physician    At least 15 min of critical care time spent through telemedicine robot at patient bedside (via interactive/real-time software) as patient is in imminent and life threatening deterioration with further treatment and evaluation. This Virtual Visit was conducted with patient's (and/or legal guardian's) consent, to provide telestroke consultation and necessary medical care. Time spent examining patient, reviewing the images personally, reviewing the chart, perform high complexity decision making and speaking with the nursing staff regarding recommendations    This is a Phone Consult, I have not seen the patient face to face, the telemedicine device was not utilized. Adriana Guzman MD, MD   Stroke, Neurocritical Care And/or [address] [place] [address] Stroke [address] [place] [address]  Electronically signed 10/31/2021 at 8:49 AM      This virtual visit was conducted via interactive/real-time audio/video.

## 2021-10-31 NOTE — ED PROVIDER NOTES
EMERGENCY DEPARTMENT ENCOUNTER    Pt Name: Evy Maxwell  MRN: [id number]  Doragfzoya 1960  Date of evaluation: 10/31/21  CHIEF COMPLAINT       Chief Complaint   Patient presents with    Emesis     HISTORY OF PRESENT ILLNESS   HPI  23-year-old female presents for evaluation of altered mental status. Patient was admitted to the hospital recently for an acute left corona radiata stroke. Was started on dual antiplatelets and discharge from the hospital several days ago. Was doing better and then about 12 hours ago started having some nausea and fatigue and vomiting. Was having some confusion and then had a syncopal episode earlier tonight. EMS was called as the patient was having worsening trouble speaking and facial droop. Patient was taking Cipro for presumed UTI but no other recent infections. Symptoms are moderate and progressive. REVIEW OF SYSTEMS     Review of Systems   Constitutional: Negative for chills and fatigue. HENT: Negative for facial swelling, postnasal drip and rhinorrhea. Eyes: Negative for photophobia and itching. Respiratory: Negative for cough and shortness of breath. Cardiovascular: Negative for chest pain and leg swelling. Gastrointestinal: Negative for abdominal pain, diarrhea, nausea and vomiting. Genitourinary: Negative for dysuria, flank pain and hematuria. Musculoskeletal: Negative for arthralgias and joint swelling. Skin: Negative for color change and rash. Neurological: Positive for facial asymmetry and speech difficulty. Negative for dizziness, numbness and headaches.      PASTMEDICAL HISTORY     Past Medical History:   Diagnosis Date    CVA (cerebral vascular accident) [facility])      Past Problem List  Patient Active Problem List   Diagnosis Code    Altered mental status R41.82    AMS (altered mental status) R41.82    Fatigue R53.83    Word finding difficulty R47.89    Mass of upper lobe of right lung R91.8    Cerebrovascular accident (CVA) due to bilateral occlusion of middle cerebral arteries (HCC) I63.513    Dehydration E86.0     SURGICAL HISTORY       Past Surgical History:   Procedure Laterality Date    TONSILLECTOMY       CURRENT MEDICATIONS       Previous Medications    ASPIRIN 81 MG EC TABLET    Take 1 tablet by mouth daily    ATORVASTATIN (LIPITOR) 40 MG TABLET    Take 1 tablet by mouth nightly    CIPROFLOXACIN (CIPRO) 500 MG TABLET    Take 1 tablet by mouth every 12 hours for 4 doses    CLOPIDOGREL (PLAVIX) 75 MG TABLET    Take 1 tablet by mouth daily    LISINOPRIL (PRINIVIL;ZESTRIL) 10 MG TABLET    Take 1 tablet by mouth daily     ALLERGIES     is allergic to pcn [penicillins] and suprax [cefixime]. FAMILY HISTORY     has no family status information on file. SOCIAL HISTORY       Social History     Tobacco Use    Smoking status: Never Smoker    Smokeless tobacco: Never Used   Substance Use Topics    Alcohol use: Not on file    Drug use: Never     PHYSICAL EXAM     INITIAL VITALS: /82   Pulse 87   Temp 99.1 °F (37.3 °C) (Oral)   Resp 18   Wt 130 lb (59 kg)   SpO2 98%   BMI 21.63 kg/m²    Physical Exam  Vitals and nursing note reviewed. Constitutional:       Appearance: She is normal weight. HENT:      Head: Normocephalic and atraumatic. Eyes:      Extraocular Movements: Extraocular movements intact. Pupils: Pupils are equal, round, and reactive to light. Cardiovascular:      Rate and Rhythm: Normal rate and regular rhythm. Pulmonary:      Effort: Pulmonary effort is normal.      Breath sounds: Normal breath sounds. Abdominal:      General: Abdomen is flat. There is no distension. Palpations: There is no mass. Musculoskeletal:         General: No swelling. Normal range of motion. Cervical back: Normal range of motion and neck supple. Skin:     General: Skin is warm and dry. Neurological:      General: No focal deficit present. Mental Status: She is alert. Mental status is at baseline. Comments: Mild to moderate word finding difficulty. Naming normal.  Mild right-sided facial droop with forehead sparing. Normal mental status. Normal sensation. No ataxia. Total NIH of 2         MED[employer] DECISION MAKIN-year-old female presents for evaluation of with recrudescence of her recent strokelike symptoms with right-sided facial droop and word finding difficulty speech difficulty in the setting of some vomiting and nausea recently. We did do a stroke alert with the patient's acute neurologic symptoms. CT head did show a small area of hemorrhagic transformation in the recent left corona radiata infarct. Patient's labs were additionally consistent with dehydration with an elevated BUN and creatinine. Reviewed recent urinalysis was borderline for infection. Suspect that the patient is either having a side effect of the antibiotic with the nausea and vomiting and some subsequent dehydration and recrudescence of stroke symptoms versus some effect of the small hemorrhagic conversion. Discussed with primary care will admit to the hospital.  Discussed with neurology as the patient is on dual antiplatelet therapy with aspirin and Plavix they recommended stopping Plavix with the hemorrhagic conversion. Continuing aspirin is okay. CRITICAL CARE:       PROCEDURES:    Procedures    DIAGNOSTIC RESULTS   EKG:All EKG's are interpreted by the Emergency Department Physician who either signs or Co-signs this chart in the absence of a cardiologist.    Sinus rhythm rate of 83 normal axis normal intervals no acute ST or T wave changes    RADIOLOGY:All plain film, CT, MRI, and formal ultrasound images (except ED bedside ultrasound) are read by the radiologist, see reports below, unless otherwisenoted in MDM or here. CTA HEAD NECK W CONTRAST   Final Result   1. Stable extent and appearance of left basal ganglia and left frontal white   matter acute ischemia, as discussed above.    2. Suspected interval development of focal hemorrhagic conversion with 7 mm   diameter acute hemorrhage at the superior margin of the left basal ganglia   ischemic region. 3. No new CT evidence of acute ischemia. 4. Right basal ganglia remote lacunar infarct, as discussed above. 5. Unremarkable CT angiogram of the head and neck. 6. Redemonstration posterior right-sided mediastinal hypodense mass lesion. Differential diagnostic considerations continue to include neurofibroma   versus schwannoma. Critical results were called by Dr. Ashok Tay to Dr. Jacquie Lugo on   10/31/2021 at 05:28. CT Head WO Contrast   Final Result   1. Stable extent and appearance of left basal ganglia and left frontal white   matter acute ischemia, as discussed above. 2. Suspected interval development of focal hemorrhagic conversion with 7 mm   diameter acute hemorrhage at the superior margin of the left basal ganglia   ischemic region. 3. No new CT evidence of acute ischemia. 4. Right basal ganglia remote lacunar infarct, as discussed above. 5. Unremarkable CT angiogram of the head and neck. 6. Redemonstration posterior right-sided mediastinal hypodense mass lesion. Differential diagnostic considerations continue to include neurofibroma   versus schwannoma. Critical results were called by Dr. Ashok Tay to Dr. Jacquie Lugo on   10/31/2021 at 05:28. LABS: All lab results were reviewed by myself, and all abnormals are listed below.   Labs Reviewed   CBC WITH AUTO DIFFERENTIAL - Abnormal; Notable for the following components:       Result Value    WBC 13.4 (*)     Seg Neutrophils 89 (*)     Lymphocytes 5 (*)     Segs Absolute 11.90 (*)     Absolute Lymph # 0.70 (*)     All other components within normal limits   COMPREHENSIVE METABOLIC PANEL W/ REFLEX TO MG FOR LOW K - Abnormal; Notable for the following components:    Glucose 160 (*)     BUN 24 (*)     CREATININE 0.99 (*)     Total Protein 6.0 (*)     GFR Non- 57 (*)     All other components within normal limits   PROTIME-INR   APTT   URINALYSIS   CREATININE W/GFR POINT OF CARE       EMERGENCY DEPARTMENTCOURSE:         Vitals:    Vitals:    10/31/21 0515   BP: 125/82   Pulse: 87   Resp: 18   Temp: 99.1 °F (37.3 °C)   TempSrc: Oral   SpO2: 98%   Weight: 130 lb (59 kg)       The patient was given the following medications while in the emergency department:  Orders Placed This Encounter   Medications    iopamidol (ISOVUE-370) 76 % injection 75 mL    0.9 % sodium chloride bolus    sodium chloride flush 0.9 % injection 10 mL    0.9 % sodium chloride IV bolus 1,000 mL     CONSULTS:  None    FINAL IMPRESSION      1. Nausea and vomiting, intractability of vomiting not specified, unspecified vomiting type    2. History of recent stroke    3. Dehydration          DISPOSITION/PLAN   DISPOSITION Admitted 10/31/2021 06:15:58 AM      PATIENT REFERRED TO:  No follow-up provider specified. DISCHARGE MEDICATIONS:  New Prescriptions    No medications on file     The care is provided during an unprecedented national emergency due to the novel coronavirus, COVID 19.   Adelita Montejo MD  Attending Emergency Physician                    Adelita Montejo MD  10/31/21 2370       Adelita Montejo MD  10/31/21 2212       Adelita Montejo MD  10/31/21 [id number]

## 2021-10-31 NOTE — ED NOTES
Pt ambulates to restroom to collect urine sample. Pt ambulates with steady gait. Pt still has some expressive aphagia.        [place] RN  10/31/21 1987

## 2021-10-31 NOTE — ED NOTES
Bed: 08  Expected date:   Expected time:   Means of arrival:   Comments:  Latoya 46, [address]  10/31/21 7013

## 2021-10-31 NOTE — PLAN OF CARE
Problem: Falls - Risk of:  Goal: Will remain free from falls  Outcome: Ongoing  Goal: Absence of physical injury  Outcome: Ongoing       NIH-2

## 2021-10-31 NOTE — ED NOTES
Report given to Etienne Hernandez from [address]. Report method in person   The following was reviewed with receiving RN:   Current vital signs:  /82   Pulse 87   Temp 99.1 °F (37.3 °C) (Oral)   Resp 18   Wt 130 lb (59 kg)   SpO2 98%   BMI 21.63 kg/m²                MEWS Score: 1     Any medication or safety alerts were reviewed. Any pending diagnostics and notifications were also reviewed, as well as any safety concerns or issues, abnormal labs, abnormal imaging, and abnormal assessment findings. Questions were answered.               Beau Castillo RN  10/31/21 7632

## 2021-10-31 NOTE — H&P
History and Physical Service  [facility]    HISTORY AND PHYSICAL EXAMINATION            Date:   10/31/2021  Patient name:  Leroy Starks  MRN:   [id number]  Account:  [de-identified]  YOB: 1960  PCP:    No primary care provider on file. Code Status:    Full Code    Chief Complaint:     Fall  History Obtained From:     Patient ,medical record ,nursing staff    History of Present Illnes       The patient is a 59 y.o. Non- / non  female who presents   33-year-old lady previously healthy was admitted earlier this week with acute left basal ganglia ischemic CVA and some mental status changes abnormal UA thought to be due to UTI empiric treated with Cipro and discharged on aspirin Plavix and Lipitor  Lisinopril for hypertension new diagnosis  Patient had a syncope last night try to walk to the bathroom blood pressure was noted 80/50  Stroke alert was called in the emergency room patient had a brain imaging done Case was discussed with neuro critical care  Small hemorrhagic conversion of the CVA 0.7 cm      Past Medical History:   Diagnosis Date    CVA (cerebral vascular accident) [facility])         Past Surgical History:     Past Surgical History:   Procedure Laterality Date    TONSILLECTOMY          Medications Prior to Admission:     Prior to Admission medications    Medication Sig Start Date End Date Taking?  Authorizing Provider   aspirin 81 MG EC tablet Take 1 tablet by mouth daily 10/30/21  Yes Joseph Dunn MD   ciprofloxacin (CIPRO) 500 MG tablet Take 1 tablet by mouth every 12 hours for 4 doses 10/29/21 10/31/21 Yes Joseph Dunn MD   atorvastatin (LIPITOR) 40 MG tablet Take 1 tablet by mouth nightly 10/29/21  Yes Joseph Dunn MD   lisinopril (PRINIVIL;ZESTRIL) 10 MG tablet Take 1 tablet by mouth daily 10/30/21  Yes Joseph Dunn MD   clopidogrel (PLAVIX) 75 MG tablet Take 1 tablet by mouth daily 10/30/21  Yes Joseph Dunn MD noted  Mouth: mucous membranes moist  Neck: supple, no carotid bruits, thyroid not palpable  Lungs: Bilateral equal air entry, clear to ausculation, no wheezing, rales or rhonchi, normal effort  Cardiovascular: normal rate, regular rhythm, no murmur, gallop, rub.   Abdomen: Soft, nontender, nondistended, normal bowel sounds, no hepatomegaly or splenomegaly  Neurologic: There are no new focal motor or sensory deficits, normal muscle tone and bulk, no abnormal sensation, normal speech, cranial nerves II through XII grossly intact  Mild right facial drooping  Skin: No gross lesions, rashes, bruising or bleeding on exposed skin area  Extremities:  peripheral pulses palpable, no pedal edema or calf pain with palpation  Psych: flat    Investigations:      Laboratory Testing:  Recent Results (from the past 24 hour(s))   CBC Auto Differential    Collection Time: 10/31/21  4:52 AM   Result Value Ref Range    WBC 13.4 (H) 3.5 - 11.0 k/uL    RBC 4.34 4.0 - 5.2 m/uL    Hemoglobin 13.5 12.0 - 16.0 g/dL    Hematocrit 40.5 36 - 46 %    MCV 93.3 80 - 100 fL    MCH 31.1 26 - 34 pg    MCHC 33.3 31 - 37 g/dL    RDW 13.1 11.5 - 14.9 %    Platelets 694 446 - 925 k/uL    MPV 9.2 6.0 - 12.0 fL    NRBC Automated NOT REPORTED per 100 WBC    Differential Type NOT REPORTED     Immature Granulocytes NOT REPORTED 0 %    Absolute Immature Granulocyte NOT REPORTED 0.00 - 0.30 k/uL    WBC Morphology NOT REPORTED     RBC Morphology NOT REPORTED     Platelet Estimate NOT REPORTED     Seg Neutrophils 89 (H) 36 - 66 %    Lymphocytes 5 (L) 24 - 44 %    Monocytes 5 1 - 7 %    Eosinophils % 0 0 - 4 %    Basophils 1 0 - 2 %    Segs Absolute 11.90 (H) 1.3 - 9.1 k/uL    Absolute Lymph # 0.70 (L) 1.0 - 4.8 k/uL    Absolute Mono # 0.70 0.1 - 1.3 k/uL    Absolute Eos # 0.00 0.0 - 0.4 k/uL    Basophils Absolute 0.10 0.0 - 0.2 k/uL   Comprehensive Metabolic Panel w/ Reflex to MG    Collection Time: 10/31/21  4:52 AM   Result Value Ref Range    Glucose 160 (H) 70 radiation dose to as low as reasonably achievable.; CTA of the head and neck was performed with the administration of intravenous contrast. Multiplanar reformatted images are provided for review. MIP images are provided for review. Stenosis of the internal carotid arteries measured using NASCET criteria. Dose modulation, iterative reconstruction, and/or weight based adjustment of the mA/kV was utilized to reduce the radiation dose to as low as reasonably achievable. Noncontrast CT of the head with reconstructed 2-D images are also provided for review. COMPARISON: CT angiogram head and neck with contrast October 27, 2021. MRI brain without and with contrast October 28, 2021. CT head scan without contrast October 27, 2021. HISTORY: ORDERING SYSTEM PROVIDED HISTORY: aphasia, facial droop TECHNOLOGIST PROVIDED HISTORY: aphasia, facial droop Decision Support Exception - unselect if not a suspected or confirmed emergency medical condition->Emergency Medical Condition (MA) Reason for Exam: Aphasia, facial droop Acuity: Acute Type of Exam: Initial; ORDERING SYSTEM PROVIDED HISTORY: facial droop, aphasia TECHNOLOGIST PROVIDED HISTORY: facial droop, aphasia Decision Support Exception - unselect if not a suspected or confirmed emergency medical condition->Emergency Medical Condition (MA) FINDINGS: CT HEAD: BRAIN/VENTRICLES:  Hypoattenuation is seen within the left basal ganglia including the left caudate nucleus, anterior limb of the internal capsule, left putamen which extends superiorly into the left periventricular deep white matter within the frontal lobe measuring up to 28 mm in diameter. New focal hyperdensity is seen within the superior margin of the ischemic region which measures up to 7 mm in diameter. Remote lacunar infarct is seen within the right caudate nucleus which measures up to 11 mm in length which extends into the anterior limb of the right internal capsule. .  Grey-white differentiation is maintained. No evidence of mass, mass effect or midline shift. No evidence of hydrocephalus. ORBITS: The visualized portion of the orbits demonstrate no acute abnormality. SINUSES:  The visualized paranasal sinuses and mastoid air cells demonstrate no acute abnormality. SOFT TISSUES/SKULL: No acute abnormality of the visualized skull or soft tissues. CTA NECK: AORTIC ARCH/ARCH VESSELS: No dissection or arterial injury. No significant stenosis of the brachiocephalic or subclavian arteries. CAROTID ARTERIES: No dissection, arterial injury, or hemodynamically significant stenosis by NASCET criteria. VERTEBRAL ARTERIES: No dissection, arterial injury, or significant stenosis. SOFT TISSUES: The lung apices are clear. No cervical or superior mediastinal lymphadenopathy. The larynx and pharynx are unremarkable. No acute abnormality of the salivary and thyroid glands. Redemonstration of hypodense posterior mediastinal mass is seen which abuts the right upper lung lobe, without interval change. BONES: No acute osseous abnormality. 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 No significant stenosis of the intracranial internal carotid, anterior cerebral, or middle cerebral arteries. No aneurysm. POSTERIOR CIRCULATION: No significant stenosis of the vertebral, basilar, or posterior cerebral arteries. No aneurysm. OTHER: No dural venous sinus thrombosis on this non-dedicated study. 1. Stable extent and appearance of left basal ganglia and left frontal white matter acute ischemia, as discussed above. 2. Suspected interval development of focal hemorrhagic conversion with 7 mm diameter acute hemorrhage at the superior margin of the left basal ganglia ischemic region. 3. No new CT evidence of acute ischemia. 4. Right basal ganglia remote lacunar infarct, as discussed above. 5. Unremarkable CT angiogram of the head and neck.  6. Redemonstration posterior right-sided mediastinal hypodense mass lesion. Differential diagnostic considerations continue to include neurofibroma versus schwannoma. Critical results were called by Dr. Mtat Minaya to Dr. Erlin Shaver on 10/31/2021 at 05:28. CT Head WO Contrast    Result Date: 10/27/2021  EXAMINATION: CT OF THE HEAD WITHOUT CONTRAST  10/27/2021 6:28 pm TECHNIQUE: CT of the head was performed without the administration of intravenous contrast. Dose modulation, iterative reconstruction, and/or weight based adjustment of the mA/kV was utilized to reduce the radiation dose to as low as reasonably achievable. COMPARISON: None available. HISTORY: ORDERING SYSTEM PROVIDED HISTORY: AMS TECHNOLOGIST PROVIDED HISTORY: AMS Decision Support Exception - unselect if not a suspected or confirmed emergency medical condition->Emergency Medical Condition (MA) Reason for Exam: Altered mental status Acuity: Acute Type of Exam: Initial FINDINGS: BRAIN/VENTRICLES: The gyri and sulci have a normal appearance. Ventricles and extra-axial spaces appear normal.  Mild diffuse periventricular and subcortical deep white matter hypoattenuation noted, findings compatible with chronic small vessel ischemic disease. Several punctate remote lacunar infarcts are present throughout the basal ganglia region bilaterally. There is a larger infarct within the caudate head and adjacent anterior left basal ganglia region which may be subacute or chronic. No associated mass effect. The gray-white matter differentiation is otherwise preserved throughout. There is no acute hemorrhage, mass, or mass effect. No evidence of acute territorial infarct. No abnormal extraaxial fluid collections. ORBITS:  The visualized portion of the orbits demonstrate no acute abnormality. SINUSES:  The mastoid air cells are normally aerated. The visualized paranasal sinuses are grossly clear. SOFT TISSUES/SKULL:  No significant abnormality of the visualized skull or soft tissues.  No acute fracture. No scalp hematoma. No evidence of acute intracranial process. Mild chronic small vessel ischemic changes noted, along with several punctate remote bilateral basal ganglia region lacunar infarcts and a larger infarct within the anterior left basal ganglia region which may be subacute or chronic. MRI THORACIC SPINE W WO CONTRAST    Result Date: 10/28/2021  EXAMINATION: MRI OF THE BRAIN WITHOUT AND WITH CONTRAST; MRI OF THE THORACIC SPINE WITHOUT AND WITH CONTRAST  10/28/2021 5:54 pm TECHNIQUE: Multiplanar multisequence MRI of the head/brain was performed without and with the administration of intravenous contrast.; Multiplanar multisequence MRI of the thoracic spine was performed without and with the administration of intravenous contrast. COMPARISON: Head CT and CTA of 10/27/2021 HISTORY: ORDERING SYSTEM PROVIDED HISTORY: abnormal CT and possible neurofibroma in spine, head CT abnormality could be a subcortical neoplastic process. TECHNOLOGIST PROVIDED HISTORY: abnormal CT and possible neurofibroma in spine, head CT abnormality could be a subcortical neoplastic process. Reason for Exam: Pt new onset of confusion, headaches, weakness x 4 days, abnormal CT scan. FINDINGS: There is a round area of restricted diffusion involving the left corona radiata extending into the left basal ganglia suggestive of acute to subacute infarction. There is minimal area of enhancement within the infarction which is not an uncommon finding for subacute areas of infarction. Old lacunar infarctions of right corona radiata and left basal ganglia. There is no acute intracranial hemorrhage, or intracranial mass lesion. No mass effect, midline shift, or extra-axial collection is noted. There are mild nonspecific foci of periventricular and subcortical cerebral white matter T2/FLAIR hyperintensity, most likely representing chronic microangiopathic disease in this age group.  The brain parenchyma is otherwise normal. The pituitary gland is normal in appearance. The cerebellar tonsils are in normal position. The ventricles, sulci, and cisterns are within normal size and range. No significant volume loss. The intracranial flow voids are preserved. The globes and orbits are within normal limits. The visualized extracranial structures including paranasal sinuses and mastoid air cells are unremarkable. Thoracic spine: BONES/ALIGNMENT: There is normal alignment of the spine. The vertebral body heights are maintained. The bone marrow signal appears unremarkable. SPINAL CORD: No abnormal cord signal is seen. SOFT TISSUES: There is T2 hyperintense extradural extra medullary enhancing mass lesion predominantly centered within the right extraforaminal location at the level T2-T3 measuring approximately 4.3 x 3.9 by 4.1 cm (AP x TV x SI). The mass lesion extends into the right neural foramen with remodeling of right neural foramen and also mildly extends into the spinal canal.  No significant mass effect on thecal sac. 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 The mass lesion exert mass effect on medial and superior aspect of right lung apex. DEGENERATIVE CHANGES: No significant spinal canal stenosis or neural foraminal narrowing of the thoracic spine. Left side perineural cyst at T1-T2. Brain MRI: Round area of restricted diffusion involving the left corona radiata extending into the left basal ganglia suggestive of acute to subacute infarction. Old lacunar infarctions of right corona radiata and left basal ganglia. There is no acute intracranial hemorrhage, or intracranial mass lesion. Mild chronic microangiopathic ischemic disease. Thoracic spine MRI: There is T2 hyperintense extradural extra medullary enhancing mass lesion predominantly centered within the right extraforaminal location at the level T2-T3 measuring approximately 4.3 x 3.9 by 4.1 cm (AP x TV x SI).  The mass lesion extends into the right neural foramen with remodeling of right neural foramen and also mildly extends into the spinal canal.  No significant mass effect on thecal sac. Findings are mostly consistent with  nerve sheath tumor such as schwannoma or neurofibroma. No cord signal abnormality. No high-grade canal or foraminal stenosis. No nerve impingement. No significant posterior disc pathology. The findings were sent to the Radiology Results [id number] at 7:54 pm on 10/28/2021to be communicated to a licensed caregiver. CTA HEAD NECK W CONTRAST    Result Date: 10/31/2021  EXAMINATION: CT OF THE HEAD WITHOUT CONTRAST; CTA OF THE HEAD AND NECK WITH CONTRAST 10/31/2021 5:00 am: TECHNIQUE: CT of the head was performed without the administration of intravenous contrast. Dose modulation, iterative reconstruction, and/or weight based adjustment of the mA/kV was utilized to reduce the radiation dose to as low as reasonably achievable.; CTA of the head and neck was performed with the administration of intravenous contrast. Multiplanar reformatted images are provided for review. MIP images are provided for review. Stenosis of the internal carotid arteries measured using NASCET criteria. Dose modulation, iterative reconstruction, and/or weight based adjustment of the mA/kV was utilized to reduce the radiation dose to as low as reasonably achievable. Noncontrast CT of the head with reconstructed 2-D images are also provided for review. COMPARISON: CT angiogram head and neck with contrast October 27, 2021. MRI brain without and with contrast October 28, 2021. CT head scan without contrast October 27, 2021.  HISTORY: ORDERING SYSTEM PROVIDED HISTORY: aphasia, facial droop TECHNOLOGIST PROVIDED HISTORY: aphasia, facial droop Decision Support Exception - unselect if not a suspected or confirmed emergency medical condition->Emergency Medical Condition (MA) Reason for Exam: Aphasia, facial droop Acuity: Acute Type of Exam: Initial; ORDERING SYSTEM PROVIDED HISTORY: facial droop, aphasia TECHNOLOGIST PROVIDED HISTORY: facial droop, aphasia Decision Support Exception - unselect if not a suspected or confirmed emergency medical condition->Emergency Medical Condition (MA) FINDINGS: CT HEAD: BRAIN/VENTRICLES:  Hypoattenuation is seen within the left basal ganglia including the left caudate nucleus, anterior limb of the internal capsule, left putamen which extends superiorly into the left periventricular deep white matter within the frontal lobe measuring up to 28 mm in diameter. New focal hyperdensity is seen within the superior margin of the ischemic region which measures up to 7 mm in diameter. Remote lacunar infarct is seen within the right caudate nucleus which measures up to 11 mm in length which extends into the anterior limb of the right internal capsule. .  Grey-white differentiation is maintained. No evidence of mass, mass effect or midline shift. No evidence of hydrocephalus. ORBITS: The visualized portion of the orbits demonstrate no acute abnormality. SINUSES:  The visualized paranasal sinuses and mastoid air cells demonstrate no acute abnormality. SOFT TISSUES/SKULL: No acute abnormality of the visualized skull or soft tissues. CTA NECK: AORTIC ARCH/ARCH VESSELS: No dissection or arterial injury. No significant stenosis of the brachiocephalic or subclavian arteries. CAROTID ARTERIES: No dissection, arterial injury, or hemodynamically significant stenosis by NASCET criteria. VERTEBRAL ARTERIES: No dissection, arterial injury, or significant stenosis. SOFT TISSUES: The lung apices are clear. No cervical or superior mediastinal lymphadenopathy. The larynx and pharynx are unremarkable. No acute abnormality of the salivary and thyroid glands. Redemonstration of hypodense posterior mediastinal mass is seen which abuts the right upper lung lobe, without interval change. BONES: No acute osseous abnormality.  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 No significant stenosis of the intracranial internal carotid, anterior cerebral, or middle cerebral arteries. No aneurysm. POSTERIOR CIRCULATION: No significant stenosis of the vertebral, basilar, or posterior cerebral arteries. No aneurysm. OTHER: No dural venous sinus thrombosis on this non-dedicated study. 1. Stable extent and appearance of left basal ganglia and left frontal white matter acute ischemia, as discussed above. 2. Suspected interval development of focal hemorrhagic conversion with 7 mm diameter acute hemorrhage at the superior margin of the left basal ganglia ischemic region. 3. No new CT evidence of acute ischemia. 4. Right basal ganglia remote lacunar infarct, as discussed above. 5. Unremarkable CT angiogram of the head and neck. 6. Redemonstration posterior right-sided mediastinal hypodense mass lesion. Differential diagnostic considerations continue to include neurofibroma versus schwannoma. Critical results were called by Dr. Alcides Blankenship to Dr. Ramiro Lewis on 10/31/2021 at 05:28. CTA HEAD NECK W CONTRAST    Result Date: 10/27/2021  EXAMINATION: CTA OF THE HEAD AND NECK WITH CONTRAST 10/27/2021 7:29 pm: TECHNIQUE: CTA of the head and neck was performed with the administration of intravenous contrast. Multiplanar reformatted images are provided for review. MIP images are provided for review. Stenosis of the internal carotid arteries measured using NASCET criteria. Dose modulation, iterative reconstruction, and/or weight based adjustment of the mA/kV was utilized to reduce the radiation dose to as low as reasonably achievable.  COMPARISON: CT head 10/27/2021 HISTORY: ORDERING SYSTEM PROVIDED HISTORY: AMS, difficulty with word-finding TECHNOLOGIST PROVIDED HISTORY: AMS, difficulty with word-finding Decision Support Exception - unselect if not a suspected or confirmed emergency medical condition->Emergency Medical Condition (MA) Reason for Exam: AMS, difficulty with word-finding Acuity: Unknown Type of Exam: Unknown FINDINGS: CTA NECK: AORTIC ARCH/ARCH VESSELS: No dissection or arterial injury. No significant stenosis of the brachiocephalic or subclavian arteries. CAROTID ARTERIES: No dissection, arterial injury, or hemodynamically significant stenosis by NASCET criteria. VERTEBRAL ARTERIES: No dissection, arterial injury, or significant stenosis. SOFT TISSUES: The lung apices are clear. No cervical or superior mediastinal lymphadenopathy. The larynx and pharynx are unremarkable. 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 No acute abnormality of the salivary and thyroid glands. BONES: Degenerate change CTA HEAD: ANTERIOR CIRCULATION: No significant stenosis of the intracranial internal carotid, anterior cerebral, or middle cerebral arteries. Small infundibulum at the origin the right PCOM. Fetal origin left PCA. Gretel [employer] Prominent A-comm likely related to aplastic or hypoplastic left A1 segment. POSTERIOR CIRCULATION: No significant stenosis of the vertebral, basilar, or posterior cerebral arteries. No aneurysm. OTHER: No dural venous sinus thrombosis on this non-dedicated study. BRAIN: Evolving left left anterior basal ganglial infarct. Negative for large vessel occlusion or hemodynamic stenosis. Post mediastinal mass right upper lung, thought to be could represent a neurofibroma versus schwannoma. .  This could be further evaluated with MRI thoracic spine or with and without without contrast for further characterization.      MRI BRAIN W WO CONTRAST    Result Date: 10/28/2021  EXAMINATION: MRI OF THE BRAIN WITHOUT AND WITH CONTRAST; MRI OF THE THORACIC SPINE WITHOUT AND WITH CONTRAST  10/28/2021 5:54 pm TECHNIQUE: Multiplanar multisequence MRI of the head/brain was performed without and with the administration of intravenous contrast.; Multiplanar multisequence MRI of the thoracic spine was performed without and with the administration of intravenous contrast. COMPARISON: Head CT and CTA of 10/27/2021 HISTORY: ORDERING SYSTEM PROVIDED HISTORY: abnormal CT and possible neurofibroma in spine, head CT abnormality could be a subcortical neoplastic process. TECHNOLOGIST PROVIDED HISTORY: abnormal CT and possible neurofibroma in spine, head CT abnormality could be a subcortical neoplastic process. Reason for Exam: Pt new onset of confusion, headaches, weakness x 4 days, abnormal CT scan. FINDINGS: There is a round area of restricted diffusion involving the left corona radiata extending into the left basal ganglia suggestive of acute to subacute infarction. There is minimal area of enhancement within the infarction which is not an uncommon finding for subacute areas of infarction. Old lacunar infarctions of right corona radiata and left basal ganglia. There is no acute intracranial hemorrhage, or intracranial mass lesion. No mass effect, midline shift, or extra-axial collection is noted. There are mild nonspecific foci of periventricular and subcortical cerebral white matter T2/FLAIR hyperintensity, most likely representing chronic microangiopathic disease in this age group. The brain parenchyma is otherwise normal. The pituitary gland is normal in appearance. The cerebellar tonsils are in normal position. The ventricles, sulci, and cisterns are within normal size and range. No significant volume loss. The intracranial flow voids are preserved. The globes and orbits are within normal limits. The visualized extracranial structures including paranasal sinuses and mastoid air cells are unremarkable. Thoracic spine: BONES/ALIGNMENT: There is normal alignment of the spine. The vertebral body heights are maintained. The bone marrow signal appears unremarkable. SPINAL CORD: No abnormal cord signal is seen.  SOFT 10/28/2021  EXAMINATION: MODIFIED BARIUM SWALLOW WAS PERFORMED IN CONJUNCTION WITH SPEECH PATHOLOGY SERVICES TECHNIQUE: Fluoroscopic evaluation of the swallowing mechanism was performed using cineradiography with multiple consistency of barium product in conjunction with speech pathology services. FLUOROSCOPY DOSE AND TYPE OR TIME AND EXPOSURES: Fluoro time: 1 minute, 18 seconds; DAP: 43.0 dGycm2; Air Kerma: 19.9 mGy COMPARISON: None HISTORY: ORDERING SYSTEM PROVIDED HISTORY: dysphagia esophaeal TECHNOLOGIST PROVIDED HISTORY: dysphagia esophaeal Reason for Exam: dyspahia Acuity: Unknown Type of Exam: Unknown 67-year-old female with dysphagia FINDINGS: No penetration or aspiration with the thin liquid substance, nectar thick liquid substance, pureed/pudding thick or soft solid substance. Remainder of the substances were not administered. No penetration or aspiration with the above administered substances. Please see separate speech pathology report for full discussion of findings and recommendations.           Current Facility-Administered Medications   Medication Dose Route Frequency Provider Last Rate Last Admin    sodium chloride flush 0.9 % injection 10 mL  10 mL IntraVENous PRN Kassandra Helton MD   10 mL at 10/31/21 0517    sodium chloride flush 0.9 % injection 5-40 mL  5-40 mL IntraVENous 2 times per day Lou Lugo MD        sodium chloride flush 0.9 % injection 5-40 mL  5-40 mL IntraVENous PRN Darci Wade MD        0.9 % sodium chloride infusion  25 mL IntraVENous PRN Lou Lugo MD        acetaminophen (TYLENOL) tablet 650 mg  650 mg Oral Q4H PRN Darci Wade MD        ondansetron (ZOFRAN-ODT) disintegrating tablet 4 mg  4 mg Oral Q8H PRN Lou Lugo MD        Or    ondansetron (ZOFRAN) injection 4 mg  4 mg IntraVENous Q6H PRN Lou Lugo MD        aspirin EC tablet 81 mg  81 mg Oral Daily Darci Wade MD        atorvastatin (LIPITOR) tablet 40 mg  40 mg Oral Nightly María Elena Echols MD         Impressions :      1. Active Problems:    Dehydration  Resolved Problems:    * No resolved hospital problems. *        2.  has a past medical history of CVA (cerebral vascular accident) ([address] Plans:     Hemorrhagic conversion of ischemic CVA of the basal ganglia  Hold Plavix continue aspirin and Lipitor 40 mg  Acute metabolic encephalopathy rule out seizure plan for EEG in a.m.   Recurrent microscopic hematuria  We will do CT abdomen pelvis with IV contrast rule out urologic malignancy  Orthostatic hypotension and syncope discontinue lisinopril IV hydration  Lengthy discussion with the family sister-in-law who is a retired nurse had many questions all questions answered satisfied   [address] input      María Elena Echols MD  10/31/2021  4:36 PM

## 2021-11-01 VITALS
WEIGHT: 125.44 LBS | SYSTOLIC BLOOD PRESSURE: 147 MMHG | OXYGEN SATURATION: 97 % | HEIGHT: 65 IN | DIASTOLIC BLOOD PRESSURE: 67 MMHG | HEART RATE: 69 BPM | TEMPERATURE: 99 F | RESPIRATION RATE: 16 BRPM | BODY MASS INDEX: 20.9 KG/M2

## 2021-11-01 PROBLEM — I95.1 SYNCOPE DUE TO ORTHOSTATIC HYPOTENSION: Status: ACTIVE | Noted: 2021-11-01

## 2021-11-01 PROBLEM — I61.0 NONTRAUMATIC SUBCORTICAL HEMORRHAGE OF CEREBRAL HEMISPHERE (HCC): Status: ACTIVE | Noted: 2021-11-01

## 2021-11-01 PROBLEM — E86.0 DEHYDRATION: Status: RESOLVED | Noted: 2021-10-31 | Resolved: 2021-11-01

## 2021-11-01 PROCEDURE — 97116 GAIT TRAINING THERAPY: CPT

## 2021-11-01 PROCEDURE — 99223 1ST HOSP IP/OBS HIGH 75: CPT | Performed by: PSYCHIATRY & NEUROLOGY

## 2021-11-01 PROCEDURE — 2580000003 HC RX 258: Performed by: INTERNAL MEDICINE

## 2021-11-01 PROCEDURE — 6370000000 HC RX 637 (ALT 250 FOR IP): Performed by: INTERNAL MEDICINE

## 2021-11-01 PROCEDURE — 97166 OT EVAL MOD COMPLEX 45 MIN: CPT

## 2021-11-01 PROCEDURE — 97530 THERAPEUTIC ACTIVITIES: CPT

## 2021-11-01 PROCEDURE — 99233 SBSQ HOSP IP/OBS HIGH 50: CPT | Performed by: INTERNAL MEDICINE

## 2021-11-01 PROCEDURE — 97162 PT EVAL MOD COMPLEX 30 MIN: CPT

## 2021-11-01 RX ORDER — ASPIRIN 81 MG/1
81 TABLET ORAL DAILY
Qty: 30 TABLET | Refills: 0 | Status: SHIPPED | OUTPATIENT
Start: 2021-11-01

## 2021-11-01 RX ORDER — ATORVASTATIN CALCIUM 40 MG/1
40 TABLET, FILM COATED ORAL NIGHTLY
Qty: 90 TABLET | Refills: 0 | Status: SHIPPED | OUTPATIENT
Start: 2021-11-01

## 2021-11-01 RX ADMIN — SODIUM CHLORIDE: 9 INJECTION, SOLUTION INTRAVENOUS at 02:30

## 2021-11-01 RX ADMIN — ASPIRIN 81 MG: 81 TABLET, COATED ORAL at 08:42

## 2021-11-01 RX ADMIN — ACETAMINOPHEN 650 MG: 325 TABLET ORAL at 00:29

## 2021-11-01 RX ADMIN — Medication 10 ML: at 08:43

## 2021-11-01 ASSESSMENT — PAIN SCALES - GENERAL
PAINLEVEL_OUTOF10: 0
PAINLEVEL_OUTOF10: 0

## 2021-11-01 NOTE — PROGRESS NOTES
[facility]: [facility]   Occupational Therapy Evaluation  Date: 21  Patient Name: Lang López       Room: 5805/2714-15  MRN: [id number]  Account: [de-identified]   : 1960  (59 y.o.) Gender: female     Discharge Recommendations: The patient may need non-skilled ADL assistance after discharge. Referring Practitioner: Sera Celestin MD  Diagnosis: Dehydration  Additional Pertinent Hx: 49-year-old lady previously healthy was admitted earlier this week with acute left basal ganglia ischemic CVA and some mental status changes abnormal UA thought to be due to UTI empiric treated with Cipro and discharged on aspirin Plavix and Lipitor       Past Medical History:  has a past medical history of CVA (cerebral vascular accident) ([address] Past Surgical History:   has a past surgical history that includes Tonsillectomy. Restrictions  Restrictions/Precautions: General Precautions  Implants present? :  (denies)  Other position/activity restrictions: Up as tolerated  Required Braces or Orthoses?: No     Vitals  Temp: 98.1 °F (36.7 °C)  Pulse: 74  Resp: 18  BP: (!) 144/69  Height: 5' 5\" (165.1 cm)  Weight: 125 lb 7.1 oz (56.9 kg)  BMI (Calculated): 20.9  Oxygen Therapy  SpO2: 95 %  Pulse Oximeter Device Mode: Intermittent  Pulse Oximeter Device Location: Finger  O2 Device: None (Room air)  Level of Consciousness: Alert (0)    Subjective  Subjective: \"I'm feeling okay today\" Pt indicated with no complaints of lightheadedness/dizziness with functional mobility  Comments: Okay for PT/OT per [employer].  Pt pleasant and agreeable for today's session  Overall Orientation Status: Within Functional Limits  Vision  Vision: Within Functional Limits  Hearing  Hearing: Within functional limits  Social/Functional History  Lives With: Spouse  Type of Home: House  Home Layout: Two level  Home Access: Stairs to enter with rails  Entrance Stairs - Number of Steps: front entrance- 3 steps with no HR, back entrance- 3 steps with L HR, second floor- FF with L HR  Bathroom Shower/Tub: Tub/Shower unit, Doors  Bathroom Toilet: Standard  Bathroom Equipment: Hand-held shower (Uses upstair shower for morning routine)  Home Equipment:  (denies)  Receives Help From: Family  ADL Assistance: Independent  Homemaking Assistance: Independent  Homemaking Responsibilities: Yes  Ambulation Assistance: Independent  Transfer Assistance: Independent  Active : Yes  Mode of Transportation: SUV  Occupation: Full time employment  Type of occupation: ;  and pt own small business  IADL Comments: sleeps in flat bed at home, recent fall d/t \"passing out\" at mother-in-law and pt was asymptomatic  Additional Comments: Pt has been visiting in the area and has not been home since last eval. Pt is from [place]. , sister in law and daughter present this morning. Objective          Sensation  Overall Sensation Status: WFL (denies)   ADL  Feeding: Modified independent   Grooming: Modified independent   UE Bathing: Supervision  LE Bathing: Supervision  UE Dressing: Modified independent   LE Dressing: Modified independent   Toileting: Supervision  Additional Comments: ADL scores based on skilled observation and clinical reasoning, unless otherwise noted. Assistance required due to recent change in medical status and pt experiencing syncope with no symptoms prior to passing out. UE Function           LUE Strength  Gross LUE Strength: WFL  L Hand General: 4+/5     LUE Tone: Normotonic     LUE AROM (degrees)  LUE AROM : WFL     Left Hand AROM (degrees)  Left Hand AROM: WFL  RUE Strength  Gross RUE Strength: WFL  R Hand General: 4+/5      RUE Tone: Normotonic     RUE AROM (degrees)  RUE AROM : WFL     Right Hand AROM (degrees)  Right Hand AROM: WFL    Fine Motor Skills  Coordination  Movements Are Fluid And Coordinated:  Yes                           Mobility  Supine to Sit: Stand by assistance  Sit to Supine: Stand by assistance Balance  Sitting Balance: Modified independent   Standing Balance: Stand by assistance  Standing Balance  Time: ~8-10 minutes  Activity: transfers, functional mobility  Comment: No complaints of lightheadedness/dizziness with activity  Functional Mobility  Functional - Mobility Device: No device  Activity:  (In hallway)  Assist Level: Contact guard assistance  Functional Mobility Comments: Assist with IV pole management; pt actively participated in stair management with intermittent use of hand rail  Bed mobility  Supine to Sit: Stand by assistance  Sit to Supine: Stand by assistance  Scooting: Stand by assistance  Comment: HOB flat     Transfers  Sit to stand: Stand by assistance  Stand to sit: Stand by assistance  Transfer Comments: No complaints of lightheadedness/dizziness  Functional Activity Tolerance  Functional Activity Tolerance: Tolerates 30 min exercise with multiple rests     Assessment  Assessment  Prognosis: Good  Decision Making: Low Complexity  REQUIRES OT FOLLOW UP: No  No Skilled OT: Independent with ADL's  Discharge Recommendations: Home with assist PRN  Activity Tolerance: Patient Tolerated treatment well         Functional Outcome Measures  AM-PAC Daily Activity Inpatient   How much help for putting on and taking off regular lower body clothing?: None  How much help for Bathing?: A Little  How much help for Toileting?: A Little  How much help for putting on and taking off regular upper body clothing?: None  How much help for taking care of personal grooming?: None  How much help for eating meals?: None  [facility]: 22  AM-PAC Inpatient ADL T-Scale Score : 47.1  ADL Inpatient CMS 0-100% Score: 25.8  ADL Inpatient CMS G-Code Modifier : CJ       Goals       Plan  Safety Devices  Safety Devices in place: Yes  Type of devices:  All fall risk precautions in place, Bed alarm in place, Call light within reach, Gait belt, Patient at risk for falls, Left in bed, Nurse notified                OT Individual Minutes  Time In: 1052  Time Out: 0993  Minutes: 36    Electronically signed by Leandro Nelson OT on 11/1/21 at 11:02 AM EDT         11/01/21 1101   OT Individual Minutes   Time In 7585   Time Out 0926   Minutes 39

## 2021-11-01 NOTE — PROGRESS NOTES
Physical Therapy    Facility/Department: [facility] PROGRESSIVE CARE  Initial Assessment    NAME: Jessica Garcia  : 1960  MRN: [id number]    Date of Service: 2021    Discharge Recommendations:  Patient would benefit from continued therapy after discharge   PT Equipment Recommendations  Equipment Needed:  (TBD)    Assessment   Body structures, Functions, Activity limitations: Decreased balance;Decreased functional mobility ; Decreased coordination  Assessment: Pt is SBA for bed mobility, SBA for transfers and CGA for walking. Vitals monitored during session. vitals monitored - BP supine with HOB elevated -(152/73, HR - 70), BP HOB flat - (143/76, HR - 79), BP in sitting positon - ( 149/71, HR - 67), BP in standing - (148/71, HR - 74), BP( taken sitted at SOB )  after walking - 145/70 and HR was 67. Treatment Diagnosis: dehydration  Specific instructions for Next Treatment: instruct on coordination exercise and advance to a flight of steps  Prognosis: Good  Decision Making: Medium Complexity  History: CVA (cerebral vascular accident)  Exam: MMT, ROM, bed mobility, transfers, gait and coordination  Clinical Presentation: Pt is SBA for bed mobility, SBA for transfers and CGA for walking  PT Education: Home Exercise Program;General Safety;Plan of Care;PT Role;Goals;Precautions;Transfer Training;Family Education;Gait Training; Injury Prevention  Barriers to Learning: none  REQUIRES PT FOLLOW UP: Yes  Activity Tolerance  Activity Tolerance: Patient Tolerated treatment well       Patient Diagnosis(es): The primary encounter diagnosis was Nausea and vomiting, intractability of vomiting not specified, unspecified vomiting type. Diagnoses of History of recent stroke and Dehydration were also pertinent to this visit. has a past medical history of CVA (cerebral vascular accident) ([facility] [address] has a past surgical history that includes Tonsillectomy.     Restrictions  Restrictions/Precautions  Restrictions/Precautions: General Precautions  Required Braces or Orthoses?: No  Implants present? :  (denies)  Position Activity Restriction  Other position/activity restrictions: Up as tolerated  Vision/Hearing  Vision: Within Functional Limits  Hearing: Within functional limits     Subjective  General  Chart Reviewed: Yes  Patient assessed for rehabilitation services?: Yes  Additional Pertinent Hx: CVA (cerebral vascular accident)  Response To Previous Treatment: Not applicable  Family / Caregiver Present: Yes (jackeline, daughter and sister in law present)  Referring Practitioner: Suraj Gipson MD  Referral Date : 10/31/21  Diagnosis: dehydration  Follows Commands: Within Functional Limits  Other (Comment): Okay per nurse Clotilde Villafana to see patient. Subjective  Subjective: Pt states she does not recall falling to ground and only got and found her self on the ground. No c/o of pain this date.   Pain Screening  Patient Currently in Pain: No  Vital Signs  Patient Currently in Pain: No       Orientation  Orientation  Overall Orientation Status: Within Functional Limits  Social/Functional History  Social/Functional History  Lives With: Spouse  Type of Home: House  Home Layout: Two level  Home Access: Stairs to enter with rails  Entrance Stairs - Number of Steps: front entrance- 3 steps with no HR, back entrance- 3 steps with L HR, second floor- FF with L HR  Bathroom Shower/Tub: Tub/Shower unit, Doors  Bathroom Toilet: Standard  Bathroom Equipment: Hand-held shower (Uses upstair shower for morning routine)  Home Equipment:  (denies)  Receives Help From: Family  ADL Assistance: Independent  Homemaking Assistance: Independent  Homemaking Responsibilities: Yes  Ambulation Assistance: Independent  Transfer Assistance: Independent  Active : Yes  Mode of Transportation: SUV  Occupation: Full time employment  Type of occupation: ;  and pt own small business  IADL Comments: sleeps in flat bed at home, recent fall d/t \"passing out\" at mother-in-law and pt was asymptomatic  Additional Comments: Pt has been visiting in the area and has not been home since last eval. Pt is from [place]. , sister in law and daughter present this morning. Cognition        Objective     Observation/Palpation  Posture: Good  Observation: peripheral IV L posterior forearm, L forearm and R. hansband, daughter and sister in law present this date. Pt sitting in bed with HOB elevated. AROM RLE (degrees)  RLE AROM: WFL  AROM LLE (degrees)  LLE AROM : WFL  AROM RUE (degrees)  RUE General AROM: see OT for UE assesssments  AROM LUE (degrees)  LUE General AROM: see OT for UE assesssments  Strength RLE  Strength RLE: Exception  R Hip Flexion: 4-/5  R Hip Extension: 4/5  R Hip ABduction: 4/5  R Hip ADduction: 4/5  R Knee Flexion: 4/5  R Knee Extension: 4/5  R Ankle Dorsiflexion: 4/5  R Ankle Plantar flexion: 4/5  Strength LLE  Strength LLE: Exception  L Hip Flexion: 4-/5  L Hip Extension: 4/5  L Hip ABduction: 4/5  L Hip ADduction: 4/5  L Knee Flexion: 4/5  L Knee Extension: 4/5  L Ankle Dorsiflexion: 4/5  L Ankle Plantar Flexion: 4/5  Strength RUE  Comment: see OT for UE assesssments  Strength LUE  Comment: see OT for UE assesssments     Sensation  Overall Sensation Status: WFL  Bed mobility  Supine to Sit: Stand by assistance  Sit to Supine: Stand by assistance  Scooting: Stand by assistance  Comment: vitals monitored - BP supine with HOB elevated -(152/73, HR - 70), BP HOB flat - (143/76, HR - 79), BP in sitting positon - ( 149/71, HR - 67), BP in standing - (148/71, HR - 74), BP( taken sitted at SOB )  after walking - 145/70 and HR was 67.   Transfers  Sit to Stand: Stand by assistance (no device used)  Stand to sit: Stand by assistance (no device used)  Comment: vitals monitored - BP supine with HOB elevated -(152/73, HR - 70), BP HOB flat - (143/76, HR - 79), BP in sitting positon - ( 149/71, HR - 67), BP in standing - (148/71, HR - 74), BP( taken sitted at SOB ) after walking - 145/70 and HR was 67. Ambulation  Ambulation?: Yes  WB Status: full WB  Ambulation 1  Surface: level tile  Device: No Device  Assistance: Contact guard assistance  Gait Deviations: None  Distance: [id number]  Comments: no c/o of dizzyness or lightheaded. BP( taken sitted at SOB )  after walking - 145/70 and HR was 67. Stairs/Curb  Stairs?: Yes  Stairs  # Steps : 6  Stairs Height: 8\"  Rails: Right ascending  Device: No Device  Assistance: Contact guard assistance  Comment: no c/o of dizzyness or lightheaded. Balance  Posture: Good  Sitting - Static: Good  Sitting - Dynamic: Good  Standing - Static: Good;- (no device used)  Standing - Dynamic: Good;- (no device used)  Exercises  Comments: Pt a little unsteady with heel to toe walking needing hold on to L rail about 2 times d/t to LOB. Did well with breading exercise alternate leg. pt performed heel to toe forward and backwards, side stepping both directions, front cross overs, back cross overs and breading both directions. Plan   Plan  Times per week: daily 3 times  Times per day:  (daily 3 times)  Specific instructions for Next Treatment: instruct on coordination exercise and advance to a flight of steps  Current Treatment Recommendations: Strengthening, Transfer Training, Endurance Training, Neuromuscular Re-education, Patient/Caregiver Education & Training, ADL/Self-care Training, Balance Training, Gait Training, Home Exercise Program, Functional Mobility Training  Safety Devices  Type of devices:  All fall risk precautions in place, Gait belt, Nurse notified, Call light within reach, Bed alarm in place, Patient at risk for falls (Nurse Mari notified)  Restraints  Initially in place: No    G-Code       OutComes Score                                                  AM-PAC Score  AM-PAC Inpatient Mobility Raw Score : 14 (11/01/21 0853)  AM-PAC Inpatient T-Scale Score : 38.1 (11/01/21 0853)  Mobility Inpatient CMS 0-100% Score: 61.29 (11/01/21 3251)  Mobility Inpatient CMS G-Code Modifier : CL (11/01/21 8615)          Goals  Short term goals  Time Frame for Short term goals: daily 3 days  Short term goal 1: pt to demonstrate ability to perform sit <=> stand transfers independently  Short term goal 2: pt to demonstrate ability to ascend and descend 12 steps with supervision  Short term goal 3: pt to demonstrate ability to walk 250ft independently for community distances  Short term goal 4: pt to demonstrate ability to perform HEP with good technique  Patient Goals   Patient goals : daily 3 days       Therapy Time   Individual Concurrent Group Co-treatment   Time In 0850         Time Out 0927         Minutes 37         Timed Code Treatment Minutes: 17 Minutes    PT evaluation/treatment is completed by MAHOGANY Russell under the direct supervision of co-signing therapist, who agrees with all documentation and evaluation/treatment.     Willy MottSPT    The above documentation completed by Student Physical Therapist Riccardo Damon  was reviewed and accepted by the supervising Clinical instructor [facility] PT.

## 2021-11-01 NOTE — PLAN OF CARE
Problem: Falls - Risk of:  Goal: Will remain free from falls  Description: Will remain free from falls  11/1/2021 0327 by Gadiel Hamilton RN  Outcome: Ongoing  Note: Patient alert and oriented. Assisted to bathroom with steady gait. Bed alarm on and patient  and patient daughter instructed to call nurse if has to get up. Daughter at bedside. 10/31/2021 1757 by Maryanne Magallon RN  Outcome: Ongoing  Goal: Absence of physical injury  Description: Absence of physical injury  11/1/2021 0327 by Gadiel Hamilton RN  Outcome: Ongoing  10/31/2021 1757 by Maryanne Magallon RN  Outcome: Ongoing     Problem: Cardiac:  Goal: Ability to maintain vital signs within normal range will improve  Description: Ability to maintain vital signs within normal range will improve  Outcome: Ongoing  Note: Telemetry NSR. No syncopal episodes. Low grade temp. Some nausea with vomiting. BP mpnitored and order obtained for prn hydralazine with parameters.   Goal: Cardiovascular alteration will improve  Description: Cardiovascular alteration will improve  Outcome: Ongoing     Problem: Health Behavior:  Goal: Will modify at least one risk factor affecting health status  Description: Will modify at least one risk factor affecting health status  Outcome: Ongoing  Goal: Identification of resources available to assist in meeting health care needs will improve  Description: Identification of resources available to assist in meeting health care needs will improve  Outcome: Ongoing     Problem: Physical Regulation:  Goal: Complications related to the disease process, condition or treatment will be avoided or minimized  Description: Complications related to the disease process, condition or treatment will be avoided or minimized  Outcome: Ongoing

## 2021-11-01 NOTE — PROGRESS NOTES
Patient family concerned about blood pressure 154/78. Patient states she did not take her lisinopril today. Moise Cortes notified of blood pressure and family concerns. Order received.

## 2021-11-01 NOTE — CONSULTS
[facility] Neurology   IN-PATIENT SERVICE      NEUROLOGY CONSULT  NOTE            Date:   11/1/2021  Patient name:  Dariel Anderson  Date of admission:  10/31/2021  YOB: 1960      Chief Complaint:     Chief Complaint   Patient presents with    Emesis       Reason for Consult:      Patient with a recent left MCA stroke now with subtle hemorrhagic conversion of that same stroke    History of Present Illness: The patient is a 59 y.o. female who presents with Emesis  . The patient was seen and examined and the chart was reviewed.  and family members are present in the room. A daughter from [place] is also on the phone. This patient was originally seen by Dr. Sarah De Jesus on 10/29/2021 for an acute hospitalization of confusion and decreased attention and alertness. CT scan of the head demonstrate evidence of a subacute and chronic strokes which were confirmed on MRI. Patient was discharged to home but on 10/31/2021 patient was brought back to the emergency room with altered mental status. She had a syncopal event. Blood pressure was recorded in the 80s according Dr. Sarah De Jesus and the chart. Patient was judged is clearly having hypotension related to dehydration. Patient has been maintained on aspirin at this time. Plavix has been discontinued. Patient was rehydrated with IVs and over the course of the night has shown marked improvement. Family members indicate that she is back to baseline. Patient has had evidence of prior old basal ganglia strokes. She has had a new event. Patient had a Covid vaccine in the past.  Family was concerned over the possibility that stroke may have been related to the Covid vaccine. They are concerned about venous sinus thrombosis in the head. This is clearly a hypertensive arterial type of stroke. I had a long discussion with the family about patient's current status and her further progress. She is from [place].   I recommended that they fly back to [place] rather than engage in a 20-hour drive across the [facility]. Patient should remain in town for the next few days to document stability. I would suggest that she continue on aspirin for the time being and that when she arrives in [place] that they go directly to the physician for further assessment and further decisions on use of Plavix or other methods of treatment. Patient will monitor blood pressure. If patient returns to a local residence for recovery of the next few days they should have access to primary care physician for management of any contingencies that may arise. They were instructed that if the patient develops a focal headache or generalized headache or change in vision or slurring of speech or signs of further stroke that they should proceed to the emergency room for further evaluation. Past Medical History:     Past Medical History:   Diagnosis Date    CVA (cerebral vascular accident) [facility])         Past Surgical History:     Past Surgical History:   Procedure Laterality Date    TONSILLECTOMY          Medications Prior to Admission:     Prior to Admission medications    Medication Sig Start Date End Date Taking? Authorizing Provider   aspirin 81 MG EC tablet Take 1 tablet by mouth daily 10/30/21  Yes Joseph Dunn MD   atorvastatin (LIPITOR) 40 MG tablet Take 1 tablet by mouth nightly 10/29/21  Yes Joseph Dunn MD   lisinopril (PRINIVIL;ZESTRIL) 10 MG tablet Take 1 tablet by mouth daily 10/30/21  Yes Joseph Dunn MD   clopidogrel (PLAVIX) 75 MG tablet Take 1 tablet by mouth daily 10/30/21  Yes Joseph Dunn MD        Allergies:     Pcn [penicillins] and Suprax [cefixime]    Social History:     Tobacco:    reports that she has never smoked. She has never used smokeless tobacco.  Alcohol:      has no history on file for alcohol use. Drug Use:  reports no history of drug use. Family History:     History reviewed.  No pertinent family history. Review of Systems:     Patient has no new complaints at this time. She did show improvement and feels well relatively speaking at this time. Physical Exam:   BP (!) 144/69   Pulse 74   Temp 98.1 °F (36.7 °C) (Oral)   Resp 18   Ht 5' 5\" (1.651 m)   Wt 125 lb 7.1 oz (56.9 kg)   SpO2 95%   BMI 20.87 kg/m²   Temp (24hrs), Av.9 °F (37.2 °C), Min:98.1 °F (36.7 °C), Max:100 °F (37.8 °C)        General examination:      General Appearance:  alert, well appearing, and in no acute distress  HEENT: Normocephalic, atraumatic, moist mucus membranes  Neck: supple  Lungs:  Respirations unlabored  Skin: No gross lesions, rashes, bruising or bleeding on exposed skin area  Extremities: No evidence of injury to arms or legs. Psych: normal affect      Neurological examination:    Mental status   Alert and oriented x 3; following all commands; speech is fluent, no dysarthria, aphasia. Memory appears intact 3/3. Language shows normal reception expression repetition. Cranial nerves   II -pupils are equal and round. III, IV, VI  extraocular muscles intact; no nystagmus; no ptosis   V - normal facial sensation                                                               VII -there appears to be a subtle droop to the right corner of the mouth. VIII - intact hearing                                                                             IX, X -patient appears to have normal phonation and swallowing. XII - midline tongue without atrophy or fasciculation     Motor function  Strength: No focal weakness noted at this time. Normal bulk and tone. No tremors                      Sensory function Intact to touch     Cerebellar  no tremors or ataxia noted. Reflex function 2/4 symmetric throughout . Gait                  Normal station patient was able to stand. Romberg testing was negative.   She appeared to be perfectly comfortable standing at bedside. Diagnostics:      Laboratory Testing:  CBC:   Recent Labs     10/31/21  0452   WBC 13.4*   HGB 13.5        BMP:    Recent Labs     10/31/21  0452 10/31/21  0503     --    K 4.1  --      --    CO2 28  --    BUN 24*  --    CREATININE 0.99* 0.81   GLUCOSE 160*  --          Lab Results   Component Value Date    CHOL 167 10/28/2021    LDLCHOLESTEROL 93 10/28/2021    HDL 60 10/28/2021    TRIG 71 10/28/2021    ALT 13 10/31/2021    AST 14 10/31/2021    INR 1.0 10/31/2021    LABA1C 5.0 10/28/2021       Imaging/Diagnostics:    Impression       Brain MRI:       Round area of restricted diffusion involving the left corona radiata   extending into the left basal ganglia suggestive of acute to subacute   infarction.       Old lacunar infarctions of right corona radiata and left basal ganglia.       There is no acute intracranial hemorrhage, or intracranial mass lesion.       Mild chronic microangiopathic ischemic disease.       Thoracic spine MRI:       There is T2 hyperintense extradural extra medullary enhancing mass lesion   predominantly centered within the right extraforaminal location at the level   T2-T3 measuring approximately 4.3 x 3.9 by 4.1 cm (AP x TV x SI). The mass   lesion extends into the right neural foramen with remodeling of right neural   foramen and also mildly extends into the spinal canal.  No significant mass   effect on thecal sac.  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       No cord signal abnormality.       No high-grade canal or foraminal stenosis.  No nerve impingement.  No   significant posterior disc pathology.       The findings were sent to the Radiology Results [address] at 7:54   pm on 10/28/2021to be communicated to a licensed caregiver. CT head 10/31/2021    Impression   1.  Stable extent and appearance of left basal ganglia and left frontal white   matter acute ischemia, as discussed above. 2. Suspected interval development of focal hemorrhagic conversion with 7 mm   diameter acute hemorrhage at the superior margin of the left basal ganglia   ischemic region. 3. No new CT evidence of acute ischemia. 4. Right basal ganglia remote lacunar infarct, as discussed above. 5. Unremarkable CT angiogram of the head and neck. 6. Redemonstration posterior right-sided mediastinal hypodense mass lesion. Differential diagnostic considerations continue to include neurofibroma   versus schwannoma. Critical results were called by Dr. Loren Stone to Dr. Nany Hernandez on   10/31/2021 at 05:28. Impression:      1. Recent infarction left MCA distribution basal ganglia with small area of hemorrhagic conversion. 2. Several old strokes identified by MRI as noted above. 3. Episode of relative dehydration which became symptomatic and is now corrected with rehydration. 4. Variable blood pressure. Plan:      Continue with aspirin for now. I would not add Plavix at this particular time.  Patient will remain at a local residence for the next several days. Family will monitor her progress. They have been advised any change in status such as headache nausea vomiting changes in vision slurring of speech or signs of stroke would necessitate a visit to the emergency room.  Family is concerned about blood pressure which is understandable. Persistent elevation above a systolic of 030 should be referred to local primary care physician.  Patient would benefit from a flight back to [place] rather than a 20-hour car ride across the [facility] to [place].  Upon arriving in [place] family should immediately consult with local physicians regarding further care. Family is making arrangements to coordinate this. Thank you for this very interesting consultation.       Electronically signed by Scott Mosley MD on 11/1/2021 at 11:30 AM    Scott Mosley MD  [facility])

## 2021-11-01 NOTE — PROGRESS NOTES
[facility] Internal Medicine    Progress Note  Patient assessed for rehabilitation services?: Yes  11/1/2021    10:13 AM    Name:   Natasha Wilburn  MRN:     [id number]     Acct:      [de-identified]   Room:   2105/2105-01   Day:  1  Admit Date:  10/31/2021  5:02 AM    PCP:   No primary care provider on file. Code Status:  Full Code    Subjective:     C/C:   Chief Complaint   Patient presents with    Emesis   dehydration  Principal Problem:    Dehydration  Active Problems:    Word finding difficulty    Mass of upper lobe of right lung    Cerebrovascular accident (CVA) due to bilateral occlusion of middle cerebral arteries ([address]    Nontraumatic subcortical hemorrhage of cerebral hemisphere ([address] on left basal ganglia ischemic stroke  Resolved Problems:    * No resolved hospital problems. *      Patient was readmitted with  Hypotension  Dehydration    She is also known to have a mass in the right upper lung  Patient is from out of town [address] [place] [address] to follow with Dr. Mcconnell there once he is discharged  Ct on 10/31/21    Patient was discharged on 1029  On 1031 she was not feeling well  She went to bathroom  And had a syncopal episode  She was noted to have low blood pressures  And it was felt she is dehydrated    In ER CT scan showed hemorrhagic conversion basal ganglia stroke    Patient does have an old right basal ganglia stroke but clinically there was no history of stroke      Stable extent and appearance of left basal ganglia and left frontal white matter acute ischemia, as discussed above. 2. Suspected interval development of focal hemorrhagic conversion with 7 mm diameter acute hemorrhage at the superior margin of the left basal ganglia ischemic region.  3. No new CT evidence of acute ischemia   Patient was recently discharged she had left basal ganglia stroke with word finding and speech abnormalities  She was also dehydrated and had some altered mental status  Known hypertensive  Neurology felt that there was a small vessel disease associated stroke     Significant last 24 hr data reviewed ;   Vitals:    10/31/21 2130 11/01/21 0015 11/01/21 0515 11/01/21 0830   BP: (!) 154/78 (!) 156/74 (!) 146/73 (!) 144/69   Pulse: 87 81 71 74   Resp: 18 18 18 18   Temp: 99.5 °F (37.5 °C) 100 °F (37.8 °C) 98.4 °F (36.9 °C) 98.1 °F (36.7 °C)   TempSrc:    Oral   SpO2: 94% 96% 96% 95%   Weight:       Height:          No results found for this or any previous visit (from the past 24 hour(s)). No results for input(s): POCGLU in the last 72 hours. CT Head WO Contrast    Result Date: 10/31/2021  EXAMINATION: CT OF THE HEAD WITHOUT CONTRAST; CTA OF THE HEAD AND NECK WITH CONTRAST 10/31/2021 5:00 am: TECHNIQUE: CT of the head was performed without the administration of intravenous contrast. Dose modulation, iterative reconstruction, and/or weight based adjustment of the mA/kV was utilized to reduce the radiation dose to as low as reasonably achievable.; CTA of the head and neck was performed with the administration of intravenous contrast. Multiplanar reformatted images are provided for review. MIP images are provided for review. Stenosis of the internal carotid arteries measured using NASCET criteria. Dose modulation, iterative reconstruction, and/or weight based adjustment of the mA/kV was utilized to reduce the radiation dose to as low as reasonably achievable. Noncontrast CT of the head with reconstructed 2-D images are also provided for review. COMPARISON: CT angiogram head and neck with contrast October 27, 2021. MRI brain without and with contrast October 28, 2021. CT head scan without contrast October 27, 2021.  HISTORY: ORDERING SYSTEM PROVIDED HISTORY: aphasia, facial droop TECHNOLOGIST PROVIDED HISTORY: aphasia, facial droop Decision Support Exception - unselect if not a suspected or confirmed emergency medical condition->Emergency Medical Condition (MA) Reason for Exam: Aphasia, facial droop Acuity: Acute Type of Exam: Initial; ORDERING SYSTEM PROVIDED HISTORY: facial droop, aphasia TECHNOLOGIST PROVIDED HISTORY: facial droop, aphasia Decision Support Exception - unselect if not a suspected or confirmed emergency medical condition->Emergency Medical Condition (MA) FINDINGS: CT HEAD: BRAIN/VENTRICLES:  Hypoattenuation is seen within the left basal ganglia including the left caudate nucleus, anterior limb of the internal capsule, left putamen which extends superiorly into the left periventricular deep white matter within the frontal lobe measuring up to 28 mm in diameter. New focal hyperdensity is seen within the superior margin of the ischemic region which measures up to 7 mm in diameter. Remote lacunar infarct is seen within the right caudate nucleus which measures up to 11 mm in length which extends into the anterior limb of the right internal capsule. .  Grey-white differentiation is maintained. No evidence of mass, mass effect or midline shift. No evidence of hydrocephalus. ORBITS: The visualized portion of the orbits demonstrate no acute abnormality. SINUSES:  The visualized paranasal sinuses and mastoid air cells demonstrate no acute abnormality. SOFT TISSUES/SKULL: No acute abnormality of the visualized skull or soft tissues. CTA NECK: AORTIC ARCH/ARCH VESSELS: No dissection or arterial injury. No significant stenosis of the brachiocephalic or subclavian arteries. CAROTID ARTERIES: No dissection, arterial injury, or hemodynamically significant stenosis by NASCET criteria. VERTEBRAL ARTERIES: No dissection, arterial injury, or significant stenosis. SOFT TISSUES: The lung apices are clear. No cervical or superior mediastinal lymphadenopathy. The larynx and pharynx are unremarkable. No acute abnormality of the salivary and thyroid glands. Redemonstration of hypodense posterior mediastinal mass is seen which abuts the right upper lung lobe, without interval change.  BONES: No acute osseous abnormality. 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 No significant stenosis of the intracranial internal carotid, anterior cerebral, or middle cerebral arteries. No aneurysm. POSTERIOR CIRCULATION: No significant stenosis of the vertebral, basilar, or posterior cerebral arteries. No aneurysm. OTHER: No dural venous sinus thrombosis on this non-dedicated study. 1.. 4. Right basal ganglia remote lacunar infarct, as discussed above. 5. Unremarkable CT angiogram of the head and neck. 6. Redemonstration posterior right-sided mediastinal hypodense mass lesion. Differential diagnostic considerations continue to include neurofibroma versus schwannoma. Critical results were called by Dr. Naomi Coleman to Dr. Lillian Guerrero on 10/31/2021 at 05:28. CT ABDOMEN PELVIS W IV CONTRAST Additional Contrast? None    Result Date: 10/31/2021  EXAMINATION: CT OF THE ABDOMEN AND PELVIS WITH CONTRAST 10/31/2021 5:06 pm TECHNIQUE: CT of the abdomen and pelvis was performed with the administration of intravenous contrast. Multiplanar reformatted images are provided for review. Dose modulation, iterative reconstruction, and/or weight based adjustment of the mA/kV was utilized to reduce the radiation dose to as low as reasonably achievable. COMPARISON: None. HISTORY: ORDERING SYSTEM PROVIDED HISTORY: hematuria TECHNOLOGIST PROVIDED HISTORY: hematuria Reason for Exam: hematuria, n/v Acuity: Unknown Type of Exam: Unknown Relevant Medical/Surgical History: cva FINDINGS: Lower Chest: Heart size is top normal.  Minimal atelectasis is present at the visualized lung bases. . Organs: There are a few small benign renal cysts. The liver, spleen, pancreas, kidneys, gallbladder, and adrenal glands otherwise appear normal. There are no renal, ureteral, or bladder calculi. No hydronephrosis or hydroureter. GI/Bowel:  There is retained barium contrast material throughout the colon as well as within the appendix. The stomach and the small and large bowel loops otherwise have an unremarkable appearance, without acute abnormality. There are no dilated loops or areas of bowel wall thickening. A high-density object within the transverse duodenum may represent an ingested tablet, less likely a metallic stent. Peritoneum/Retroperitoneum: There is no free fluid or extraluminal gas. No enlarged or suspicious mesenteric or retroperitoneal lymphadenopathy. The abdominal aorta and iliac arteries are patent and of normal caliber. Pelvis: No pelvic free fluid or enlarged or suspicious pelvic or inguinal lymphadenopathy. No appreciable uterine or adnexal abnormality. Excreted contrast material is present throughout the urinary bladder. No bladder wall thickening. The pelvic bowel loops are unremarkable. Bones/Soft Tissues: No significant osseous abnormality. No acute fracture. No abdominal wall or inguinal hernia. Unremarkable contrast-enhanced CT examination of the abdomen and pelvis, without finding to account for the patient's hematuria. CTA HEAD NECK W CONTRAST    Result Date: 10/31/2021  EXAMINATION: CT OF THE HEAD WITHOUT CONTRAST; CTA OF THE HEAD AND NECK WITH CONTRAST 10/31/2021 5:00 am: TECHNIQUE: CT of the head was performed without the administration of intravenous contrast. Dose modulation, iterative reconstruction, and/or weight based adjustment of the mA/kV was utilized to reduce the radiation dose to as low as reasonably achievable.; CTA of the head and neck was performed with the administration of intravenous contrast. Multiplanar reformatted images are provided for review. MIP images are provided for review. Stenosis of the internal carotid arteries measured using NASCET criteria. Dose modulation, iterative reconstruction, and/or weight based adjustment of the mA/kV was utilized to reduce the radiation dose to as low as reasonably achievable. Noncontrast CT of the head with reconstructed 2-D images are also provided for review. COMPARISON: CT angiogram head and neck with contrast October 27, 2021. MRI brain without and with contrast October 28, 2021. CT head scan without contrast October 27, 2021. HISTORY: ORDERING SYSTEM PROVIDED HISTORY: aphasia, facial droop TECHNOLOGIST PROVIDED HISTORY: aphasia, facial droop Decision Support Exception - unselect if not a suspected or confirmed emergency medical condition->Emergency Medical Condition (MA) Reason for Exam: Aphasia, facial droop Acuity: Acute Type of Exam: Initial; ORDERING SYSTEM PROVIDED HISTORY: facial droop, aphasia TECHNOLOGIST PROVIDED HISTORY: facial droop, aphasia Decision Support Exception - unselect if not a suspected or confirmed emergency medical condition->Emergency Medical Condition (MA) FINDINGS: CT HEAD: BRAIN/VENTRICLES:  Hypoattenuation is seen within the left basal ganglia including the left caudate nucleus, anterior limb of the internal capsule, left putamen which extends superiorly into the left periventricular deep white matter within the frontal lobe measuring up to 28 mm in diameter. New focal hyperdensity is seen within the superior margin of the ischemic region which measures up to 7 mm in diameter. Remote lacunar infarct is seen within the right caudate nucleus which measures up to 11 mm in length which extends into the anterior limb of the right internal capsule. .  Grey-white differentiation is maintained. No evidence of mass, mass effect or midline shift. No evidence of hydrocephalus. ORBITS: The visualized portion of the orbits demonstrate no acute abnormality. SINUSES:  The visualized paranasal sinuses and mastoid air cells demonstrate no acute abnormality. SOFT TISSUES/SKULL: No acute abnormality of the visualized skull or soft tissues. CTA NECK: AORTIC ARCH/ARCH VESSELS: No dissection or arterial injury.   No significant stenosis of the brachiocephalic or subclavian arteries. CAROTID ARTERIES: No dissection, arterial injury, or hemodynamically significant stenosis by NASCET criteria. VERTEBRAL ARTERIES: No dissection, arterial injury, or significant stenosis. SOFT TISSUES: The lung apices are clear. No cervical or superior mediastinal lymphadenopathy. The larynx and pharynx are unremarkable. No acute abnormality of the salivary and thyroid glands. Redemonstration of hypodense posterior mediastinal mass is seen which abuts the right upper lung lobe, without interval change. BONES: No acute osseous abnormality. 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 No significant stenosis of the intracranial internal carotid, anterior cerebral, or middle cerebral arteries. No aneurysm. POSTERIOR CIRCULATION: No significant stenosis of the vertebral, basilar, or posterior cerebral arteries. No aneurysm. OTHER: No dural venous sinus thrombosis on this non-dedicated study. 1. Stable extent and appearance of left basal ganglia and left frontal white matter acute ischemia, as discussed above. 2. Suspected interval development of focal hemorrhagic conversion with 7 mm diameter acute hemorrhage at the superior margin of the left basal ganglia ischemic region. 3. No new CT evidence of acute ischemia. 4. Right basal ganglia remote lacunar infarct, as discussed above. 5. Unremarkable CT angiogram of the head and neck. 6. Redemonstration posterior right-sided mediastinal hypodense mass lesion. Differential diagnostic considerations continue to include neurofibroma versus schwannoma. Critical results were called by Dr. Elle Shoemaker to Dr. Janna Camacho on 10/31/2021 at 05:28. On admission     The patient is a 59 y.o.   Non- / non  female who presents   56-year-old lady previously healthy was admitted earlier this week with acute left basal ganglia ischemic CVA and some mental status changes abnormal UA thought to be due to UTI empiric treated with Cipro and discharged on aspirin Plavix and Lipitor  Lisinopril for hypertension new diagnosis  Patient had a syncope last night try to walk to the bathroom blood pressure was noted 80/50  Stroke alert was called in the emergency room patient had a brain imaging done Case was discussed with neuro critical care  Small hemorrhagic conversion of the CVA 0.7 cm        MRI noted incidental finding of left basal ganglia ischemic stroke acute on subacute and some other areas of old stroke noted Case discussed at length and MRI images reviewed with the neurologist Dr. Niecy Hewitt  Likely small vessel disease secondary to hypertension  Start aspirin Lipitor Plavix echocardiogram pending  Probable UTI WBC 10 RBC in the urine cultures pending continue Cipro         Review of Systems:     Constitutional:  negative for chills, fevers, sweats  Respiratory:  negative for cough, dyspnea on exertion, hemoptysis, shortness of breath, wheezing  Cardiovascular:  negative for chest pain, chest pressure/discomfort, lower extremity edema, palpitations  Gastrointestinal:  negative for abdominal pain, constipation, diarrhea, nausea, vomiting  Neurological:  negative for dizziness, headache  Data:     Past Medical History:  no change     Social History:  no change    Family History: @no change    Vitals:      I/O (24Hr): Intake/Output Summary (Last 24 hours) at 11/1/2021 1013  Last data filed at 11/1/2021 0515  Gross per 24 hour   Intake 1582 ml   Output 800 ml   Net 782 ml       Labs:    Radiology:    Medications:      Allergies:      Current Meds:   Scheduled Meds:    sodium chloride flush  5-40 mL IntraVENous 2 times per day    aspirin  81 mg Oral Daily    atorvastatin  40 mg Oral Nightly     Continuous Infusions:    sodium chloride      sodium chloride 100 mL/hr at 11/01/21 0230     PRN Meds: sodium chloride flush, sodium chloride flush, sodium chloride, acetaminophen, ondansetron **OR** ondansetron, sodium chloride flush, hydrALAZINE      Physical Examination:        BP (!) 144/69   Pulse 74   Temp 98.1 °F (36.7 °C) (Oral)   Resp 18   Ht 5' 5\" (1.651 m)   Wt 125 lb 7.1 oz (56.9 kg)   SpO2 95%   BMI 20.87 kg/m²   Temp (24hrs), Av.9 °F (37.2 °C), Min:98.1 °F (36.7 °C), Max:100 °F (37.8 °C)    No results for input(s): POCGLU in the last 72 hours. Intake/Output Summary (Last 24 hours) at 2021 1013  Last data filed at 2021 0515  Gross per 24 hour   Intake 1582 ml   Output 800 ml   Net 782 ml       General Appearance:  alert, well appearing, and in no acute distress  Mental status:   Head:  normocephalic, atraumatic. Eye: no icterus, redness, pupils equal and reactive, extraocular eye movements intact, conjunctiva clear  Ear: normal external ear, no discharge, hearing intact  Nose:  no drainage noted  Mouth: mucous membranes moist  Neck: supple, no carotid bruits, thyroid not palpable  Lungs: Bilateral equal air entry, clear to ausculation, no wheezing, rales or rhonchi, normal effort  Cardiovascular: normal rate, regular rhythm, no murmur, gallop, rub. Abdomen: Soft, nontender, nondistended, normal bowel sounds, no hepatomegaly or splenomegaly  Neurologic: There are no new focal motor or sensory deficits,   Skin: No gross lesions, rashes, bruising or bleeding on exposed skin area  Extremities:  peripheral pulses palpable, no pedal edema or calf pain with palpation  Psych:             Assessment:        Primary Problem  Dehydration    Principal Problem:    Dehydration  Active Problems:    Word finding difficulty    Mass of upper lobe of right lung    Cerebrovascular accident (CVA) due to bilateral occlusion of middle cerebral arteries (HCC)    Nontraumatic subcortical hemorrhage of cerebral hemisphere ([address] on left basal ganglia ischemic stroke  Resolved Problems:    * No resolved hospital problems.  *       Plan:          21    · Patient was discharged on 1029  · Came back with hypotension not feeling well  · Syncopal episode  · On CT hemorrhagic conversion of the left basal ganglia stroke was noted  · Patient is stable  · No new or new neurological deficit  · Blood pressure has been okay elizabeth  · Antihypertensive medication was discontinued    neuro input awaited   . Hospital Problems         Last Modified POA    * (Principal) Dehydration 11/1/2021 Yes    Word finding difficulty 11/1/2021 Yes    Mass of upper lobe of right lung 11/1/2021 Yes    Cerebrovascular accident (CVA) due to bilateral occlusion of middle cerebral arteries ([address] 11/1/2021 Yes    Nontraumatic subcortical hemorrhage of cerebral hemisphere ([address] on left basal ganglia ischemic stroke 11/1/2021 Yes                         Thanks for consulting us . Will monitor vitals and clinical course , and  Optimize therapy  as needed .            Brandy Duval MD

## 2021-11-01 NOTE — CARE COORDINATION
Writer received VM, from Pt's , requesting Information to get Medical Records/Images. Writer spoke to [facility], in Medical Records, [address] she informed writer that there is a Packet at "[employer] for pt's to use, w/ Instructions to get these records. Writer did leave Packet (Information) w/ Pt. Along w/ Medical Records Number & Radiology #, [phone], for them to call w/ any questions. Pt states, She will relay information to , when he returns. Instructed to call writer w/ any questions.

## 2021-11-01 NOTE — CARE COORDINATION
CASE MANAGEMENT NOTE:    Admission Date:  10/31/2021 Glen Drake is a 59 y.o.  female    Admitted for : Dehydration [E86.0]  History of recent stroke [Z86.73]  Nausea and vomiting, intractability of vomiting not specified, unspecified vomiting type [R11.2]    Met with:  Patient's , Orma Fitting    PCP:  Dr. Betty Bingham in [facility]:  PHYSICIAN'S CHOICE [facility]      Is patient alert and oriented at time of discussion:  Yes    Current Residence/ Living Arrangements:  independently at home, Is Here from [place], Visiting Family            Current Services PTA:  No    Does patient go to outpatient dialysis: No  If yes, location and chair time: NA    Is patient agreeable to VNS: No    Freedom of choice provided:  No    List of [address] provided: No    VNS chosen:  No    DME:  none    Home Oxygen: No    Nebulizer: No    CPAP/BIPAP: No    Supplier: N/A    Potential Assistance Needed: No    SNF needed: No    Freedom of choice and list provided: NA    Pharmacy:  Would like [facility] to take with them if needed       Does Patient want to use MEDS to BEDS? No, We do not take their Insurance    Is patient currently receiving oral anticoagulation therapy? No    Is the Patient an [facility] with Readmission Risk Score greater than 14%? No  If yes, pt needs a follow up appointment made within 7 days. Family Members/Caregivers that pt would like involved in their care:    Yes    If yes, list name here:  , [address]    Transportation Provider:  Family             Discharge Plan:  11/1/21, Readmit, Recent Left MCA Stroke, [facility], Pt. Is here w/ , from [place], Visiting [facility]. Plan is to return when medically ready. Returned to hospital, R/T AMS, Low BP. Neuro saw today, PT/OT. Anticipate DC today w/ Family. Needs [facility], to take with them. Will follow. //KB             Electronically signed by: Ag Sadler RN on 11/1/2021 at 11:41 AM

## 2021-11-01 NOTE — PROGRESS NOTES
Patient educated on discharge instructions which include: medication schedule, reasons for new medications and some side effects and need to follow-up. Patient given new prescriptions during teaching. Patient verbalizes understanding of discharge and states readiness for discharge. All belongings were gathered by patient and in patient's possession. No distress noted at this time.      Current vital signs:  BP (!) 147/67   Pulse 69   Temp 99 °F (37.2 °C) (Oral)   Resp 16   Ht 5' 5\" (1.651 m)   Wt 125 lb 7.1 oz (56.9 kg)   SpO2 97%   BMI 20.87 kg/m²                MEWS Score: 1

## 2021-11-02 LAB
EKG ATRIAL RATE: 83 BPM
EKG P AXIS: 73 DEGREES
EKG P-R INTERVAL: 166 MS
EKG Q-T INTERVAL: 358 MS
EKG QRS DURATION: 76 MS
EKG QTC CALCULATION (BAZETT): 420 MS
EKG R AXIS: 56 DEGREES
EKG T AXIS: 91 DEGREES
EKG VENTRICULAR RATE: 83 BPM

## 2021-11-02 PROCEDURE — 93010 ELECTROCARDIOGRAM REPORT: CPT | Performed by: INTERNAL MEDICINE

## 2021-11-02 NOTE — DISCHARGE SUMMARY
[facility] Internal Medicine    Discharge Summary     Patient ID: Baron Thapa  :  1960   MRN: [id number]     ACCOUNT:  [de-identified]   Patient's PCP: No primary care provider on file. Admit Date: 10/31/2021   Discharge Date: 2021   Length of Stay: 1  Code Status:  Prior  Admitting Physician: Donato Marquez MD  Discharge Physician: Flori Silverman MD     Active Discharge Diagnoses:     Primary Problem  Nontraumatic subcortical hemorrhage of cerebral hemisphere [facility] Problems    Diagnosis Date Noted    Nontraumatic subcortical hemorrhage of cerebral hemisphere ([address] on left basal ganglia ischemic stroke [I61.0] 2021    Syncope due to orthostatic hypotension [I95.1] 2021    Nausea and vomiting [R11.2]     Left sided lacunar stroke ([address] [I63.81]     History of recent stroke [Z86.73]     Cerebrovascular accident (CVA) due to bilateral occlusion of middle cerebral arteries ([address] [I63.513] 10/29/2021    Mass of upper lobe of right lung [R91.8] 10/28/2021    Word finding difficulty [R47.89] 10/28/2021       Admission Condition:  fair     Discharged Condition: fair    Hospital Stay:     Hospital Course:  Baron Thapa is a 59 y.o. female who was admitted for the management of   .     , presented with Emesis      ,                         Nontraumatic subcortical hemorrhage of cerebral hemisphere ([address]                                Principal Problem:    Nontraumatic subcortical hemorrhage of cerebral hemisphere ([address] on left basal ganglia ischemic stroke  Active Problems:    Word finding difficulty    Mass of upper lobe of right lung    Cerebrovascular accident (CVA) due to bilateral occlusion of middle cerebral arteries ([address]    Syncope due to orthostatic hypotension    Nausea and vomiting    Left sided lacunar stroke ([address]    History of recent stroke  Resolved Problems:    Dehydration       Significant therapeutic interventions:        Patient was readmitted with  Hypotension  Dehydration     She is also known to have a mass in the right upper lung  Patient is from out of town [address] [place] [address] to follow with  Over there once he is discharged  Ct on 10/31/21     Patient was discharged on 1029  On 1031 she was not feeling well  She went to bathroom  And had a syncopal episode  She was noted to have low blood pressures  And it was felt she is dehydrated     In ER CT scan showed hemorrhagic conversion basal ganglia stroke     Patient does have an old right basal ganglia stroke but clinically there was no history of stroke       Stable extent and appearance of left basal ganglia and left frontal white matter acute ischemia, as discussed above. 2. Suspected interval development of focal hemorrhagic conversion with 7 mm diameter acute hemorrhage at the superior margin of the left basal ganglia ischemic region.  3. No new CT evidence of acute ischemia   Patient was recently discharged she had left basal ganglia stroke with word finding and speech abnormalities  She was also dehydrated and had some altered mental status  Known hypertensive  Neurology felt that there was a small vessel disease associated stroke           Significant Diagnostic Studies:   Labs / Micro:       Results for orders placed or performed during the hospital encounter of 10/31/21   CBC Auto Differential   Result Value Ref Range    WBC 13.4 (H) 3.5 - 11.0 k/uL    RBC 4.34 4.0 - 5.2 m/uL    Hemoglobin 13.5 12.0 - 16.0 g/dL    Hematocrit 40.5 36 - 46 %    MCV 93.3 80 - 100 fL    MCH 31.1 26 - 34 pg    MCHC 33.3 31 - 37 g/dL    RDW 13.1 11.5 - 14.9 %    Platelets 283 726 - 552 k/uL    MPV 9.2 6.0 - 12.0 fL    NRBC Automated NOT REPORTED per 100 WBC    Differential Type NOT REPORTED     Immature Granulocytes NOT REPORTED 0 %    Absolute Immature Granulocyte NOT REPORTED 0.00 - 0.30 k/uL    WBC Morphology NOT REPORTED     RBC Morphology NOT REPORTED Platelet Estimate NOT REPORTED     Seg Neutrophils 89 (H) 36 - 66 %    Lymphocytes 5 (L) 24 - 44 %    Monocytes 5 1 - 7 %    Eosinophils % 0 0 - 4 %    Basophils 1 0 - 2 %    Segs Absolute 11.90 (H) 1.3 - 9.1 k/uL    Absolute Lymph # 0.70 (L) 1.0 - 4.8 k/uL    Absolute Mono # 0.70 0.1 - 1.3 k/uL    Absolute Eos # 0.00 0.0 - 0.4 k/uL    Basophils Absolute 0.10 0.0 - 0.2 k/uL   Comprehensive Metabolic Panel w/ Reflex to MG   Result Value Ref Range    Glucose 160 (H) 70 - 99 mg/dL    BUN 24 (H) 8 - 23 mg/dL    CREATININE 0.99 (H) 0.50 - 0.90 mg/dL    Bun/Cre Ratio NOT REPORTED 9 - 20    Calcium 9.2 8.6 - 10.4 mg/dL    Sodium 140 135 - 144 mmol/L    Potassium 4.1 3.7 - 5.3 mmol/L    Chloride 103 98 - 107 mmol/L    CO2 28 20 - 31 mmol/L    Anion Gap 9 9 - 17 mmol/L    Alkaline Phosphatase 57 35 - 104 U/L    ALT 13 5 - 33 U/L    AST 14 <32 U/L    Total Bilirubin 1.00 0.3 - 1.2 mg/dL    Total Protein 6.0 (L) 6.4 - 8.3 g/dL    Albumin 4.1 3.5 - 5.2 g/dL    Albumin/Globulin Ratio NOT REPORTED 1.0 - 2.5    GFR Non- 57 (L) >60 mL/min    GFR African American >60 >60 mL/min    GFR Comment          GFR Staging NOT REPORTED    Urinalysis, reflex to microscopic   Result Value Ref Range    Color, UA Yellow Yellow    Turbidity UA Clear Clear    Glucose, Ur NEGATIVE NEGATIVE    Bilirubin Urine NEGATIVE NEGATIVE    Ketones, Urine NEGATIVE NEGATIVE    Specific Gravity, UA 1.056 (H) 1.000 - 1.030    Urine Hgb MOD (A) NEGATIVE    pH, UA 6.0 5.0 - 8.0    Protein, UA NEGATIVE NEGATIVE    Urobilinogen, Urine Normal Normal    Nitrite, Urine NEGATIVE NEGATIVE    Leukocyte Esterase, Urine NEGATIVE NEGATIVE    Urinalysis Comments NOT REPORTED    Protime-INR   Result Value Ref Range    Protime 13.0 11.8 - 14.6 sec    INR 1.0    APTT   Result Value Ref Range    PTT 25.1 24.0 - 36.0 sec   Microscopic Urinalysis   Result Value Ref Range    -          WBC, UA 2 TO 5 /HPF    RBC, UA 10 TO 20 /HPF    Casts UA NOT REPORTED /LPF Crystals, UA NOT REPORTED None /HPF    Epithelial Cells UA NOT REPORTED /HPF    Renal Epithelial, UA NOT REPORTED 0 /HPF    Bacteria, UA FEW (A) None    Mucus, UA NOT REPORTED None    Trichomonas, UA NOT REPORTED None    Amorphous, UA NOT REPORTED None    Other Observations UA NOT REPORTED NOT REQ. Yeast, UA NOT REPORTED None   Creatinine W/GFR Point of Care   Result Value Ref Range    POC Creatinine 0.81 0.51 - 1.19 mg/dL    GFR Comment >60 >60 mL/min    GFR Non-African American >60 >60 mL/min    GFR Comment         EKG 12 Lead   Result Value Ref Range    Ventricular Rate 83 BPM    Atrial Rate 83 BPM    P-R Interval 166 ms    QRS Duration 76 ms    Q-T Interval 358 ms    QTc Calculation (Bazett) 420 ms    P Axis 73 degrees    R Axis 56 degrees    T Axis 91 degrees        {Radiology:    ECHO Complete 2D W Doppler W Color    Result Date: 11/1/2021  [facility] Transthoracic Echocardiography Report (TTE)  Patient Name Yu Feldman     Date of Study               10/29/2021               Cleo Alejandra   Date of      1960  Gender                      Female  Birth   Age          59 year(s)  Race                           Room Number  2121        Height:                     65 inch, 165.1 cm   Corporate ID [id number]    Weight:                     130 pounds, 59 kg  #   Patient Acct [de-identified]   BSA:          1.65 m^2      BMI:       21.63  #                                                               kg/m^2   MR #         [id number]      Sonographer                 Christin Reyes   Accession #  [id number]  Interpreting Physician      Antwan Mosqueda   Fellow                   Referring Nurse                           Practitioner   Interpreting             Referring Physician         Corrine Daigle *  Fellow  Type of Study   TTE procedure:2D Echocardiogram, M-Mode, Doppler, Color Doppler.   Procedure Date Date: 10/29/2021 Start: 01:13 PM Study Location: [facility] Technical Quality: [address] [place] [address] visualization Indications:CVA. Patient Status: STAT Contrast Medium: Bubble Study. Amount - 10 ml Height: 65 inches Weight: 130 pounds BSA: 1.65 m^2 BMI: 21.63 kg/m^2 Rhythm: Within normal limits HR: 68 bpm BP: 126/57 mmHg CONCLUSIONS Summary Global left ventricular systolic function is normal. Estimated LV EF 60-65 %. No obvious wall motion abnormality seen. Evidence of mild (grade I) diastolic dysfunction. Normal right ventricular size and function. Mild aortic insufficiency. Mild mitral regurgitation. Mild tricuspid regurgitation. No pulmonary hypertension. Estimated right ventricular systolic pressure is [id number]. Signature ----------------------------------------------------------------------------  Electronically signed by Christin Reyes(Sonographer) on 10/29/2021 01:46  PM ---------------------------------------------------------------------------- ----------------------------------------------------------------------------  Electronically signed by Antwan Mosqueda(Interpreting physician) on  11/01/2021 09:13 AM ---------------------------------------------------------------------------- FINDINGS Left Atrium Left atrium is normal in size. Left Ventricle Left ventricle is normal in size and wall thickness. Global left ventricular systolic function is normal. Estimated LV EF 60-65 %. No obvious wall motion abnormality seen. Evidence of mild (grade I) diastolic dysfunction. Right Atrium Right atrium is normal in size. Right Ventricle Normal right ventricular size and function. Mitral Valve Thickened mitral valve leaflets. Mild mitral regurgitation. Aortic Valve No obvious valvular abnormality seen. Mild aortic insufficiency. Tricuspid Valve No obvious valvular abnormality. Mild tricuspid regurgitation. No pulmonary hypertension. Estimated right ventricular systolic pressure is [id number]. Pulmonic Valve Pulmonic valve was not well visualized. No evidence of pulmonic insufficiency or stenosis.  Pericardial Effusion No significant pericardial effusion is seen. Pleural Effusion No pleural effusion seen. Miscellaneous Normal aortic root dimension. E/E' average = 10. M-mode / 2D Measurements & Calculations:   LVIDd:5.06 cm(3.7 - 5.6 cm)      Diastolic DVHPEX:134 ml  IARML:5.72 cm(2.2 - 4.0 cm)      Systolic INKQF.5 ml  CVSE:5.65 cm(0.6 - 1.1 cm)       Aortic Root:2.9 cm(2.0 - 3.7 cm)  LVPWd:0.93 cm(0.6 - 1.1 cm)      LA Dimension: 3.5 cm(1.9 - 4.0 cm)  Fractional Shortenin.57 %    LA volume/Index: 48.7 ml /30m^2  Calculated LVEF (%): 73.38 %     LVOT:1.9 cm   Mitral:                               Aortic   Peak E-Wave: 0.81 m/s                 Peak Velocity: 1.60 m/s  Peak A-Wave: 1.14 m/s                 Mean Velocity: 1.12 m/s  E/A Ratio: 0.71                       Peak Gradient: 10.24 mmHg  Peak Gradient: 2.62 mmHg              Mean Gradient: 6 mmHg  Deceleration Time: 285 msec                                         Area (continuity): 1.98 cm^2                                        AV VTI: 31.3 cm   Tricuspid:                            Pulmonic:   Estimated RVSP: 19 mmHg  Peak TR Velocity: 2.05 m/s  Peak TR Gradient: 16.81 mmHg  Estimated RA Pressure: 3 mmHg                                        Estimated PASP: 19.81 mmHg  Septal Wall E' velocity:0.08 m/s Lateral Wall E' velocity:0.08 m/s    CT Head WO Contrast    Result Date: 10/31/2021  EXAMINATION: CT OF THE HEAD WITHOUT CONTRAST; CTA OF THE HEAD AND NECK WITH CONTRAST 10/31/2021 5:00 am: TECHNIQUE: CT of the head was performed without the administration of intravenous contrast. Dose modulation, iterative reconstruction, and/or weight based adjustment of the mA/kV was utilized to reduce the radiation dose to as low as reasonably achievable.; CTA of the head and neck was performed with the administration of intravenous contrast. Multiplanar reformatted images are provided for review. MIP images are provided for review.  Stenosis of the internal carotid arteries TISSUES/SKULL: No acute abnormality of the visualized skull or soft tissues. CTA NECK: AORTIC ARCH/ARCH VESSELS: No dissection or arterial injury. No significant stenosis of the brachiocephalic or subclavian arteries. CAROTID ARTERIES: No dissection, arterial injury, or hemodynamically significant stenosis by NASCET criteria. VERTEBRAL ARTERIES: No dissection, arterial injury, or significant stenosis. SOFT TISSUES: The lung apices are clear. No cervical or superior mediastinal lymphadenopathy. The larynx and pharynx are unremarkable. No acute abnormality of the salivary and thyroid glands. Redemonstration of hypodense posterior mediastinal mass is seen which abuts the right upper lung lobe, without interval change. BONES: No acute osseous abnormality. 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 No significant stenosis of the intracranial internal carotid, anterior cerebral, or middle cerebral arteries. No aneurysm. POSTERIOR CIRCULATION: No significant stenosis of the vertebral, basilar, or posterior cerebral arteries. No aneurysm. OTHER: No dural venous sinus thrombosis on this non-dedicated study. 1. Stable extent and appearance of left basal ganglia and left frontal white matter acute ischemia, as discussed above. 2. Suspected interval development of focal hemorrhagic conversion with 7 mm diameter acute hemorrhage at the superior margin of the left basal ganglia ischemic region. 3. No new CT evidence of acute ischemia. 4. Right basal ganglia remote lacunar infarct, as discussed above. 5. Unremarkable CT angiogram of the head and neck. 6. Redemonstration posterior right-sided mediastinal hypodense mass lesion. Differential diagnostic considerations continue to include neurofibroma versus schwannoma. Critical results were called by Dr. Марина Camacho to Dr. Lena Suazo on 10/31/2021 at 05:28.      CT Head WO Contrast    Result Date: 10/27/2021  EXAMINATION: CT OF THE HEAD WITHOUT CONTRAST  10/27/2021 6:28 pm TECHNIQUE: CT of the head was performed without the administration of intravenous contrast. Dose modulation, iterative reconstruction, and/or weight based adjustment of the mA/kV was utilized to reduce the radiation dose to as low as reasonably achievable. COMPARISON: None available. HISTORY: ORDERING SYSTEM PROVIDED HISTORY: AMS TECHNOLOGIST PROVIDED HISTORY: AMS Decision Support Exception - unselect if not a suspected or confirmed emergency medical condition->Emergency Medical Condition (MA) Reason for Exam: Altered mental status Acuity: Acute Type of Exam: Initial FINDINGS: BRAIN/VENTRICLES: The gyri and sulci have a normal appearance. Ventricles and extra-axial spaces appear normal.  Mild diffuse periventricular and subcortical deep white matter hypoattenuation noted, findings compatible with chronic small vessel ischemic disease. Several punctate remote lacunar infarcts are present throughout the basal ganglia region bilaterally. There is a larger infarct within the caudate head and adjacent anterior left basal ganglia region which may be subacute or chronic. No associated mass effect. The gray-white matter differentiation is otherwise preserved throughout. There is no acute hemorrhage, mass, or mass effect. No evidence of acute territorial infarct. No abnormal extraaxial fluid collections. ORBITS:  The visualized portion of the orbits demonstrate no acute abnormality. SINUSES:  The mastoid air cells are normally aerated. The visualized paranasal sinuses are grossly clear. SOFT TISSUES/SKULL:  No significant abnormality of the visualized skull or soft tissues. No acute fracture. No scalp hematoma. No evidence of acute intracranial process.   Mild chronic small vessel ischemic changes noted, along with several punctate remote bilateral basal ganglia region lacunar infarcts and a larger infarct within the anterior left basal ganglia region which may be subacute or chronic. MRI THORACIC SPINE W WO CONTRAST    Result Date: 10/28/2021  EXAMINATION: MRI OF THE BRAIN WITHOUT AND WITH CONTRAST; MRI OF THE THORACIC SPINE WITHOUT AND WITH CONTRAST  10/28/2021 5:54 pm TECHNIQUE: Multiplanar multisequence MRI of the head/brain was performed without and with the administration of intravenous contrast.; Multiplanar multisequence MRI of the thoracic spine was performed without and with the administration of intravenous contrast. COMPARISON: Head CT and CTA of 10/27/2021 HISTORY: ORDERING SYSTEM PROVIDED HISTORY: abnormal CT and possible neurofibroma in spine, head CT abnormality could be a subcortical neoplastic process. TECHNOLOGIST PROVIDED HISTORY: abnormal CT and possible neurofibroma in spine, head CT abnormality could be a subcortical neoplastic process. Reason for Exam: Pt new onset of confusion, headaches, weakness x 4 days, abnormal CT scan. FINDINGS: There is a round area of restricted diffusion involving the left corona radiata extending into the left basal ganglia suggestive of acute to subacute infarction. There is minimal area of enhancement within the infarction which is not an uncommon finding for subacute areas of infarction. Old lacunar infarctions of right corona radiata and left basal ganglia. There is no acute intracranial hemorrhage, or intracranial mass lesion. No mass effect, midline shift, or extra-axial collection is noted. There are mild nonspecific foci of periventricular and subcortical cerebral white matter T2/FLAIR hyperintensity, most likely representing chronic microangiopathic disease in this age group. The brain parenchyma is otherwise normal. The pituitary gland is normal in appearance. The cerebellar tonsils are in normal position. The ventricles, sulci, and cisterns are within normal size and range. No significant volume loss. The intracranial flow voids are preserved.  The globes and orbits are within normal limits. The visualized extracranial structures including paranasal sinuses and mastoid air cells are unremarkable. Thoracic spine: BONES/ALIGNMENT: There is normal alignment of the spine. The vertebral body heights are maintained. The bone marrow signal appears unremarkable. SPINAL CORD: No abnormal cord signal is seen. SOFT TISSUES: There is T2 hyperintense extradural extra medullary enhancing mass lesion predominantly centered within the right extraforaminal location at the level T2-T3 measuring approximately 4.3 x 3.9 by 4.1 cm (AP x TV x SI). The mass lesion extends into the right neural foramen with remodeling of right neural foramen and also mildly extends into the spinal canal.  No significant mass effect on thecal sac. 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 The mass lesion exert mass effect on medial and superior aspect of right lung apex. DEGENERATIVE CHANGES: No significant spinal canal stenosis or neural foraminal narrowing of the thoracic spine. Left side perineural cyst at T1-T2. Brain MRI: Round area of restricted diffusion involving the left corona radiata extending into the left basal ganglia suggestive of acute to subacute infarction. Old lacunar infarctions of right corona radiata and left basal ganglia. There is no acute intracranial hemorrhage, or intracranial mass lesion. Mild chronic microangiopathic ischemic disease. Thoracic spine MRI: There is T2 hyperintense extradural extra medullary enhancing mass lesion predominantly centered within the right extraforaminal location at the level T2-T3 measuring approximately 4.3 x 3.9 by 4.1 cm (AP x TV x SI). The mass lesion extends into the right neural foramen with remodeling of right neural foramen and also mildly extends into the spinal canal.  No significant mass effect on thecal sac. Findings are mostly consistent with  nerve sheath tumor such as schwannoma or neurofibroma.  No Pelvis: No pelvic free fluid or enlarged or suspicious pelvic or inguinal lymphadenopathy. No appreciable uterine or adnexal abnormality. Excreted contrast material is present throughout the urinary bladder. No bladder wall thickening. The pelvic bowel loops are unremarkable. Bones/Soft Tissues: No significant osseous abnormality. No acute fracture. No abdominal wall or inguinal hernia. Unremarkable contrast-enhanced CT examination of the abdomen and pelvis, without finding to account for the patient's hematuria. CTA HEAD NECK W CONTRAST    Result Date: 10/31/2021  EXAMINATION: CT OF THE HEAD WITHOUT CONTRAST; CTA OF THE HEAD AND NECK WITH CONTRAST 10/31/2021 5:00 am: TECHNIQUE: CT of the head was performed without the administration of intravenous contrast. Dose modulation, iterative reconstruction, and/or weight based adjustment of the mA/kV was utilized to reduce the radiation dose to as low as reasonably achievable.; CTA of the head and neck was performed with the administration of intravenous contrast. Multiplanar reformatted images are provided for review. MIP images are provided for review. Stenosis of the internal carotid arteries measured using NASCET criteria. Dose modulation, iterative reconstruction, and/or weight based adjustment of the mA/kV was utilized to reduce the radiation dose to as low as reasonably achievable. Noncontrast CT of the head with reconstructed 2-D images are also provided for review. COMPARISON: CT angiogram head and neck with contrast October 27, 2021. MRI brain without and with contrast October 28, 2021. CT head scan without contrast October 27, 2021.  HISTORY: ORDERING SYSTEM PROVIDED HISTORY: aphasia, facial droop TECHNOLOGIST PROVIDED HISTORY: aphasia, facial droop Decision Support Exception - unselect if not a suspected or confirmed emergency medical condition->Emergency Medical Condition (MA) Reason for Exam: Aphasia, facial droop Acuity: Acute Type of Exam: Initial; ORDERING SYSTEM PROVIDED HISTORY: facial droop, aphasia TECHNOLOGIST PROVIDED HISTORY: facial droop, aphasia Decision Support Exception - unselect if not a suspected or confirmed emergency medical condition->Emergency Medical Condition (MA) FINDINGS: CT HEAD: BRAIN/VENTRICLES:  Hypoattenuation is seen within the left basal ganglia including the left caudate nucleus, anterior limb of the internal capsule, left putamen which extends superiorly into the left periventricular deep white matter within the frontal lobe measuring up to 28 mm in diameter. New focal hyperdensity is seen within the superior margin of the ischemic region which measures up to 7 mm in diameter. Remote lacunar infarct is seen within the right caudate nucleus which measures up to 11 mm in length which extends into the anterior limb of the right internal capsule. .  Grey-white differentiation is maintained. No evidence of mass, mass effect or midline shift. No evidence of hydrocephalus. ORBITS: The visualized portion of the orbits demonstrate no acute abnormality. SINUSES:  The visualized paranasal sinuses and mastoid air cells demonstrate no acute abnormality. SOFT TISSUES/SKULL: No acute abnormality of the visualized skull or soft tissues. CTA NECK: AORTIC ARCH/ARCH VESSELS: No dissection or arterial injury. No significant stenosis of the brachiocephalic or subclavian arteries. CAROTID ARTERIES: No dissection, arterial injury, or hemodynamically significant stenosis by NASCET criteria. VERTEBRAL ARTERIES: No dissection, arterial injury, or significant stenosis. SOFT TISSUES: The lung apices are clear. No cervical or superior mediastinal lymphadenopathy. The larynx and pharynx are unremarkable. No acute abnormality of the salivary and thyroid glands. Redemonstration of hypodense posterior mediastinal mass is seen which abuts the right upper lung lobe, without interval change. BONES: No acute osseous abnormality.  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 No significant stenosis of the intracranial internal carotid, anterior cerebral, or middle cerebral arteries. No aneurysm. POSTERIOR CIRCULATION: No significant stenosis of the vertebral, basilar, or posterior cerebral arteries. No aneurysm. OTHER: No dural venous sinus thrombosis on this non-dedicated study. 1. Stable extent and appearance of left basal ganglia and left frontal white matter acute ischemia, as discussed above. 2. Suspected interval development of focal hemorrhagic conversion with 7 mm diameter acute hemorrhage at the superior margin of the left basal ganglia ischemic region. 3. No new CT evidence of acute ischemia. 4. Right basal ganglia remote lacunar infarct, as discussed above. 5. Unremarkable CT angiogram of the head and neck. 6. Redemonstration posterior right-sided mediastinal hypodense mass lesion. Differential diagnostic considerations continue to include neurofibroma versus schwannoma. Critical results were called by Dr. Jorge Martinez to Dr. Nakita Perez on 10/31/2021 at 05:28. CTA HEAD NECK W CONTRAST    Result Date: 10/27/2021  EXAMINATION: CTA OF THE HEAD AND NECK WITH CONTRAST 10/27/2021 7:29 pm: TECHNIQUE: CTA of the head and neck was performed with the administration of intravenous contrast. Multiplanar reformatted images are provided for review. MIP images are provided for review. Stenosis of the internal carotid arteries measured using NASCET criteria. Dose modulation, iterative reconstruction, and/or weight based adjustment of the mA/kV was utilized to reduce the radiation dose to as low as reasonably achievable.  COMPARISON: CT head 10/27/2021 HISTORY: ORDERING SYSTEM PROVIDED HISTORY: AMS, difficulty with word-finding TECHNOLOGIST PROVIDED HISTORY: AMS, difficulty with word-finding Decision Support Exception - unselect if not a suspected or confirmed emergency medical condition->Emergency Medical Condition (MA) Reason for Exam: AMS, difficulty with word-finding Acuity: Unknown Type of Exam: Unknown FINDINGS: CTA NECK: AORTIC ARCH/ARCH VESSELS: No dissection or arterial injury. No significant stenosis of the brachiocephalic or subclavian arteries. CAROTID ARTERIES: No dissection, arterial injury, or hemodynamically significant stenosis by NASCET criteria. VERTEBRAL ARTERIES: No dissection, arterial injury, or significant stenosis. SOFT TISSUES: The lung apices are clear. No cervical or superior mediastinal lymphadenopathy. The larynx and pharynx are unremarkable. 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 No acute abnormality of the salivary and thyroid glands. BONES: Degenerate change CTA HEAD: ANTERIOR CIRCULATION: No significant stenosis of the intracranial internal carotid, anterior cerebral, or middle cerebral arteries. Small infundibulum at the origin the right PCOM. Fetal origin left PCA. Dalila Rast Prominent A-comm likely related to aplastic or hypoplastic left A1 segment. POSTERIOR CIRCULATION: No significant stenosis of the vertebral, basilar, or posterior cerebral arteries. No aneurysm. OTHER: No dural venous sinus thrombosis on this non-dedicated study. BRAIN: Evolving left left anterior basal ganglial infarct. Negative for large vessel occlusion or hemodynamic stenosis. Post mediastinal mass right upper lung, thought to be could represent a neurofibroma versus schwannoma. .  This could be further evaluated with MRI thoracic spine or with and without without contrast for further characterization.      MRI BRAIN W WO CONTRAST    Result Date: 10/28/2021  EXAMINATION: MRI OF THE BRAIN WITHOUT AND WITH CONTRAST; MRI OF THE THORACIC SPINE WITHOUT AND WITH CONTRAST  10/28/2021 5:54 pm TECHNIQUE: Multiplanar multisequence MRI of the head/brain was performed without and with the administration of intravenous contrast.; Multiplanar multisequence MRI of the thoracic spine was performed without and with the administration of intravenous contrast. COMPARISON: Head CT and CTA of 10/27/2021 HISTORY: ORDERING SYSTEM PROVIDED HISTORY: abnormal CT and possible neurofibroma in spine, head CT abnormality could be a subcortical neoplastic process. TECHNOLOGIST PROVIDED HISTORY: abnormal CT and possible neurofibroma in spine, head CT abnormality could be a subcortical neoplastic process. Reason for Exam: Pt new onset of confusion, headaches, weakness x 4 days, abnormal CT scan. FINDINGS: There is a round area of restricted diffusion involving the left corona radiata extending into the left basal ganglia suggestive of acute to subacute infarction. There is minimal area of enhancement within the infarction which is not an uncommon finding for subacute areas of infarction. Old lacunar infarctions of right corona radiata and left basal ganglia. There is no acute intracranial hemorrhage, or intracranial mass lesion. No mass effect, midline shift, or extra-axial collection is noted. There are mild nonspecific foci of periventricular and subcortical cerebral white matter T2/FLAIR hyperintensity, most likely representing chronic microangiopathic disease in this age group. The brain parenchyma is otherwise normal. The pituitary gland is normal in appearance. The cerebellar tonsils are in normal position. The ventricles, sulci, and cisterns are within normal size and range. No significant volume loss. The intracranial flow voids are preserved. The globes and orbits are within normal limits. The visualized extracranial structures including paranasal sinuses and mastoid air cells are unremarkable. Thoracic spine: BONES/ALIGNMENT: There is normal alignment of the spine. The vertebral body heights are maintained. The bone marrow signal appears unremarkable. SPINAL CORD: No abnormal cord signal is seen.  SOFT TISSUES: There is T2 hyperintense extradural extra medullary enhancing mass lesion predominantly centered within the right extraforaminal location at the level T2-T3 measuring approximately 4.3 x 3.9 by 4.1 cm (AP x TV x SI). The mass lesion extends into the right neural foramen with remodeling of right neural foramen and also mildly extends into the spinal canal.  No significant mass effect on thecal sac. 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 The mass lesion exert mass effect on medial and superior aspect of right lung apex. DEGENERATIVE CHANGES: No significant spinal canal stenosis or neural foraminal narrowing of the thoracic spine. Left side perineural cyst at T1-T2. Brain MRI: Round area of restricted diffusion involving the left corona radiata extending into the left basal ganglia suggestive of acute to subacute infarction. Old lacunar infarctions of right corona radiata and left basal ganglia. There is no acute intracranial hemorrhage, or intracranial mass lesion. Mild chronic microangiopathic ischemic disease. Thoracic spine MRI: There is T2 hyperintense extradural extra medullary enhancing mass lesion predominantly centered within the right extraforaminal location at the level T2-T3 measuring approximately 4.3 x 3.9 by 4.1 cm (AP x TV x SI). The mass lesion extends into the right neural foramen with remodeling of right neural foramen and also mildly extends into the spinal canal.  No significant mass effect on thecal sac. Findings are mostly consistent with  nerve sheath tumor such as schwannoma or neurofibroma. No cord signal abnormality. No high-grade canal or foraminal stenosis. No nerve impingement. No significant posterior disc pathology. The findings were sent to the Radiology Results [id number] at 7:54 pm on 10/28/2021to be communicated to a licensed caregiver.      FL MODIFIED BARIUM SWALLOW W VIDEO    Result Date: 10/28/2021  EXAMINATION: MODIFIED BARIUM SWALLOW WAS PERFORMED IN CONJUNCTION WITH SPEECH PATHOLOGY SERVICES TECHNIQUE: Fluoroscopic evaluation of the swallowing mechanism was performed using cineradiography with multiple consistency of barium product in conjunction with speech pathology services. FLUOROSCOPY DOSE AND TYPE OR TIME AND EXPOSURES: Fluoro time: 1 minute, 18 seconds; DAP: 43.0 dGycm2; Air Kerma: 19.9 mGy COMPARISON: None HISTORY: ORDERING SYSTEM PROVIDED HISTORY: dysphagia esophaeal TECHNOLOGIST PROVIDED HISTORY: dysphagia esophaeal Reason for Exam: dyspahia Acuity: Unknown Type of Exam: Unknown 27-year-old female with dysphagia FINDINGS: No penetration or aspiration with the thin liquid substance, nectar thick liquid substance, pureed/pudding thick or soft solid substance. Remainder of the substances were not administered. No penetration or aspiration with the above administered substances. Please see separate speech pathology report for full discussion of findings and recommendations. Consultations:    Consults:     Final Specialist Recommendations/Findings:   IP CONSULT TO NEUROLOGY      The patient was seen and examined on day of discharge and this discharge summary is in conjunction with any daily progress note from day of discharge. Discharge plan:     Disposition: Home    Physician Follow Up:   With PCP and as specified     Requiring Further Evaluation/Follow Up POST HOSPITALIZATION/Incidental Findings:    Diet: regular     Activity: As tolerated    I  Discharge Medications:      Medication List      CONTINUE taking these medications    aspirin 81 MG EC tablet  Take 1 tablet by mouth daily     atorvastatin 40 MG tablet  Commonly known as: LIPITOR  Take 1 tablet by mouth nightly        STOP taking these medications    ciprofloxacin 500 MG tablet  Commonly known as: CIPRO     clopidogrel 75 MG tablet  Commonly known as: PLAVIX     lisinopril 10 MG tablet  Commonly known as: PRINIVIL;ZESTRIL           Where to Get Your Medications      You can get these medications from any pharmacy    Bring a paper prescription for each of these medications  · aspirin 81 MG EC tablet  · atorvastatin 40 MG tablet           Time spent on discharge planning ;          [] less than 30 minutes . [x]   more  than 30 minutes . Ellectronically signed by   Riccardo Burris MD      Thank you Dr. Earle Navarro primary care provider on file. for the opportunity to be involved in this patient's care. Please note that this chart was generated using voice recognition Dragon dictation software. Although every effort was made to ensure the accuracy of this automated transcription, some errors in transcription may have occurred.

## 2021-11-03 NOTE — ADT AUTH CERT
Stroke: Hemorrhagic - Care Day 1 (10/31/2021) by Shon Correia RN       Review Status Review Entered   Completed 11/2/2021 15:22      Criteria Review      Care Day: 1 Care Date: 10/31/2021 Level of Care: Intermediate Care    Guideline Day 1    Clinical Status    (X) * Clinical Indications met    11/2/2021 3:22 PM EDT by Svitlana Celeste      CT-Suspected interval development of focal hemorrhagic conversion with 7 mm  diameter acute hemorrhage at the superior margin of the left basal ganglia  ischemic region. Activity    ( ) Bed rest    11/2/2021 3:22 PM EDT by Svitlana Celeste      up as mariza    Routes    (X) IV fluids    11/2/2021 3:22 PM EDT by Svitlana Celeste      NS at 100ml/h    (X) IV medications    11/2/2021 3:22 PM EDT by Svitlana Celeste      IV Zofran 4mg X1    Interventions    (X) Cardiac monitoring    11/2/2021 3:22 PM EDT by Denny Matamoros    (X) Neurology and neurosurgical consultations    11/2/2021 3:22 PM EDT by Svitlana Celeste      Neurology    * Milestone   Additional Notes   10/31      Internal Med Admit Note         Patient had a syncope last night try to walk to the bathroom blood pressure was noted 80/50   Stroke alert was called in the emergency room patient had a brain imaging done Case was discussed with neuro critical care   Small hemorrhagic conversion of the CVA 0.7 cm                Physical Exam:   /71   Pulse 81   Temp 98.6 °F (37 °C) (Oral)   Resp 16   Ht 5' 5\" (1.651 m)   Wt 125 lb 7.1 oz (56.9 kg)   SpO2 98%   BMI 20.87 kg/m²    No results for input(s): POCGLU in the last 72 hours.       General Appearance:  alert, well appearing, and in no acute distress   Mental status: oriented to person, place, and time with normal affect   Head:  normocephalic, atraumatic.    Eye: no icterus, redness, pupils equal and reactive, extraocular eye movements intact, conjunctiva clear   Ear: normal external ear, no discharge, hearing intact   Nose:  no drainage noted   Mouth: mucous membranes moist   Neck: supple, no carotid bruits, thyroid not palpable   Lungs: Bilateral equal air entry, clear to ausculation, no wheezing, rales or rhonchi, normal effort   Cardiovascular: normal rate, regular rhythm, no murmur, gallop, rub. Abdomen: Soft, nontender, nondistended, normal bowel sounds, no hepatomegaly or splenomegaly   Neurologic: There are no new focal motor or sensory deficits, normal muscle tone and bulk, no abnormal sensation, normal speech, cranial nerves II through XII grossly intact   Mild right facial drooping   Skin: No gross lesions, rashes, bruising or bleeding on exposed skin area   Extremities:  peripheral pulses palpable, no pedal edema or calf pain with palpation   Psych: flat         Plans:       Hemorrhagic conversion of ischemic CVA of the basal ganglia   Hold Plavix continue aspirin and Lipitor 40 mg   Acute metabolic encephalopathy rule out seizure plan for EEG in a.m. Recurrent microscopic hematuria   We will do CT abdomen pelvis with IV contrast rule out urologic malignancy   Orthostatic hypotension and syncope discontinue lisinopril IV hydration   Lengthy discussion with the family sister-in-law who is a retired nurse had many questions all questions answered satisfied    Neur input      _________________________    Misael [facility] Neuro Note   was called about this patient who is well known to me. She suffered a rather large left subcortical stroke and had some mental status changes that were perhaps in part due to a UTI. I was informed that she had a syncopal event and at one point a systolic BP in the 93U.     Clearly hypotension and perhaps dehydration are the main issues. It is doubtful that she suffered a new stroke but the type of stroke she had is quite notorious for having fluctuations.        There is some scant hemorrhagic conversion in the prior stroke       Patient will be seen tomorrow by consult service.           ____________________________      Meds   PO ASA 81mg qd    IV NS at 100ml/h   PRN's--IV Zofran 4mg X1      Stroke: Hemorrhagic - Clinical Indications for Admission to Inpatient Care by Amrik De Dios RN       Review Status Review Entered   Completed 11/2/2021 15:19      Criteria Review      Clinical Indications for Admission to Inpatient Care    Most Recent : [place] Most Recent Date: 11/2/2021 3:19 PM EDT    (X) Admission is indicated for  1 or more  of the following  (1) (2) (3) (4) (5) (6) (7) (8):       (X) Acute hemorrhagic (eg, intracerebral) stroke       11/2/2021 3:19 PM EDT by Ani Perry  Per CT   Additional Notes   10/31      To ER      Chief Complaint   Patient presents with   · Emesis       HISTORY OF PRESENT ILLNESS   HPI   27-year-old female presents for evaluation of altered mental status.  Patient was admitted to the hospital recently for an acute left corona radiata stroke.  Was started on dual antiplatelets and discharge from the hospital several days ago. [place] C[place] doing better and then about 12 hours ago started having some nausea and fatigue and vomiting.  Was having some confusion and then had a syncopal episode earlier tonight.  EMS was called as the patient was having worsening trouble speaking and facial droop.  Patient was taking Cipro for presumed UTI but no other recent infections.                 Past Medical History:   Diagnosis Date   · CVA (cerebral vascular accident) ([address]       INITIAL VITALS: /82   Pulse 87   Temp 99.1 °F (37.3 °C) (Oral)   Resp 18   Wt 130 lb (59 kg)   SpO2 98%   BMI 21.63 kg/m²             CTA HEAD NECK W CONTRAST   Final Result   1. Stable extent and appearance of left basal ganglia and left frontal white   matter acute ischemia, as discussed above. 2. Suspected interval development of focal hemorrhagic conversion with 7 mm   diameter acute hemorrhage at the superior margin of the left basal ganglia   ischemic region. 3. No new CT evidence of acute ischemia.    4. Right basal ganglia remote lacunar infarct, as discussed above. 5. Unremarkable CT angiogram of the head and neck. 6. Redemonstration posterior right-sided mediastinal hypodense mass lesion. Differential diagnostic considerations continue to include neurofibroma   versus schwannoma. WBC 13.4 (*)   Glucose 160 (*)       BUN 24 (*)       CREATININE 0.99 (*)       Total Protein 6.0 (*)       GFR Non- 57 (*)       FINAL IMPRESSION        1. Nausea and vomiting, intractability of vomiting not specified, unspecified vomiting type    2. History of recent stroke    3. Dehydration             ______________________________________         Telestroke   Assessment   Zaida Beltrán is a 59 y.o. female with recent past medical history of left coronary radiata acute infarct on dual antiplatelet presented with nausea, vomiting, fatigue and worsening right-sided weakness, speech difficulty. NIH 02. Differential DDx:   Recrudescence of prior ischemic stroke           Recommendations:   1. Hospital admission for medical management. 2. C/w single antiplatelet and statin    3.  SBP goal 100-140 mm Hg       ___________________      ER Meds'IV NS 80ml   IV NS 80ml   IV NS 1 Liter

## 2021-11-17 NOTE — PLAN OF CARE
Patient was not started on Chemical DVt prophylaxis due to high risk of  cerebral bleed . Imaging studies showed; Stable extent and appearance of left basal ganglia and left frontal white matter acute ischemia, as discussed above. 2. Suspected interval development of focal hemorrhagic conversion with 7 mm diameter acute hemorrhage at the superior margin of the left basal ganglia ischemic region.  3. No new CT evidence of acute ischemia